# Patient Record
Sex: FEMALE | Race: WHITE | NOT HISPANIC OR LATINO | Employment: FULL TIME | URBAN - METROPOLITAN AREA
[De-identification: names, ages, dates, MRNs, and addresses within clinical notes are randomized per-mention and may not be internally consistent; named-entity substitution may affect disease eponyms.]

---

## 2021-05-28 ENCOUNTER — RECORDS - HEALTHEAST (OUTPATIENT)
Dept: ADMINISTRATIVE | Facility: CLINIC | Age: 41
End: 2021-05-28

## 2021-09-27 DIAGNOSIS — Z11.59 ENCOUNTER FOR SCREENING FOR OTHER VIRAL DISEASES: ICD-10-CM

## 2021-10-10 ENCOUNTER — LAB (OUTPATIENT)
Dept: FAMILY MEDICINE | Facility: CLINIC | Age: 41
End: 2021-10-10
Payer: COMMERCIAL

## 2021-10-10 DIAGNOSIS — Z11.59 ENCOUNTER FOR SCREENING FOR OTHER VIRAL DISEASES: ICD-10-CM

## 2021-10-10 PROCEDURE — U0005 INFEC AGEN DETEC AMPLI PROBE: HCPCS

## 2021-10-10 PROCEDURE — U0003 INFECTIOUS AGENT DETECTION BY NUCLEIC ACID (DNA OR RNA); SEVERE ACUTE RESPIRATORY SYNDROME CORONAVIRUS 2 (SARS-COV-2) (CORONAVIRUS DISEASE [COVID-19]), AMPLIFIED PROBE TECHNIQUE, MAKING USE OF HIGH THROUGHPUT TECHNOLOGIES AS DESCRIBED BY CMS-2020-01-R: HCPCS

## 2021-10-11 LAB — SARS-COV-2 RNA RESP QL NAA+PROBE: NEGATIVE

## 2021-10-12 ASSESSMENT — MIFFLIN-ST. JEOR: SCORE: 1190.25

## 2021-10-13 ENCOUNTER — ANESTHESIA EVENT (OUTPATIENT)
Dept: SURGERY | Facility: AMBULATORY SURGERY CENTER | Age: 41
End: 2021-10-13

## 2021-10-14 ENCOUNTER — HOSPITAL ENCOUNTER (OUTPATIENT)
Facility: AMBULATORY SURGERY CENTER | Age: 41
End: 2021-10-14
Attending: OBSTETRICS & GYNECOLOGY
Payer: COMMERCIAL

## 2021-10-14 ENCOUNTER — ANESTHESIA (OUTPATIENT)
Dept: SURGERY | Facility: AMBULATORY SURGERY CENTER | Age: 41
End: 2021-10-14

## 2021-10-14 VITALS
HEIGHT: 62 IN | TEMPERATURE: 97.3 F | RESPIRATION RATE: 16 BRPM | SYSTOLIC BLOOD PRESSURE: 87 MMHG | BODY MASS INDEX: 23 KG/M2 | DIASTOLIC BLOOD PRESSURE: 48 MMHG | HEART RATE: 58 BPM | OXYGEN SATURATION: 98 % | WEIGHT: 125 LBS

## 2021-10-14 DIAGNOSIS — N70.91 SALPINGITIS: ICD-10-CM

## 2021-10-14 LAB
HCG UR QL: NEGATIVE
HEMOGLOBIN: 13.1 G/DL
INTERNAL QC OK POCT: NORMAL

## 2021-10-14 RX ORDER — SODIUM CHLORIDE, SODIUM LACTATE, POTASSIUM CHLORIDE, CALCIUM CHLORIDE 600; 310; 30; 20 MG/100ML; MG/100ML; MG/100ML; MG/100ML
INJECTION, SOLUTION INTRAVENOUS CONTINUOUS
Status: DISCONTINUED | OUTPATIENT
Start: 2021-10-14 | End: 2021-10-15 | Stop reason: HOSPADM

## 2021-10-14 RX ORDER — ONDANSETRON 4 MG/1
4 TABLET, ORALLY DISINTEGRATING ORAL EVERY 30 MIN PRN
Status: DISCONTINUED | OUTPATIENT
Start: 2021-10-14 | End: 2021-10-15 | Stop reason: HOSPADM

## 2021-10-14 RX ORDER — PROPOFOL 10 MG/ML
INJECTION, EMULSION INTRAVENOUS PRN
Status: DISCONTINUED | OUTPATIENT
Start: 2021-10-14 | End: 2021-10-14

## 2021-10-14 RX ORDER — HYDROMORPHONE HCL IN WATER/PF 6 MG/30 ML
0.2 PATIENT CONTROLLED ANALGESIA SYRINGE INTRAVENOUS EVERY 5 MIN PRN
Status: DISCONTINUED | OUTPATIENT
Start: 2021-10-14 | End: 2021-10-15 | Stop reason: HOSPADM

## 2021-10-14 RX ORDER — IBUPROFEN 800 MG/1
800 TABLET, FILM COATED ORAL ONCE
Status: DISCONTINUED | OUTPATIENT
Start: 2021-10-14 | End: 2021-10-15 | Stop reason: HOSPADM

## 2021-10-14 RX ORDER — DEXAMETHASONE SODIUM PHOSPHATE 4 MG/ML
INJECTION, SOLUTION INTRA-ARTICULAR; INTRALESIONAL; INTRAMUSCULAR; INTRAVENOUS; SOFT TISSUE PRN
Status: DISCONTINUED | OUTPATIENT
Start: 2021-10-14 | End: 2021-10-14

## 2021-10-14 RX ORDER — ONDANSETRON 2 MG/ML
4 INJECTION INTRAMUSCULAR; INTRAVENOUS EVERY 30 MIN PRN
Status: DISCONTINUED | OUTPATIENT
Start: 2021-10-14 | End: 2021-10-15 | Stop reason: HOSPADM

## 2021-10-14 RX ORDER — ACETAMINOPHEN 325 MG/1
975 TABLET ORAL ONCE
Status: DISCONTINUED | OUTPATIENT
Start: 2021-10-14 | End: 2021-10-15 | Stop reason: HOSPADM

## 2021-10-14 RX ORDER — PHENYLEPHRINE HYDROCHLORIDE 10 MG/ML
INJECTION INTRAVENOUS PRN
Status: DISCONTINUED | OUTPATIENT
Start: 2021-10-14 | End: 2021-10-14

## 2021-10-14 RX ORDER — FENTANYL CITRATE 50 UG/ML
INJECTION, SOLUTION INTRAMUSCULAR; INTRAVENOUS PRN
Status: DISCONTINUED | OUTPATIENT
Start: 2021-10-14 | End: 2021-10-14

## 2021-10-14 RX ORDER — LIDOCAINE 40 MG/G
CREAM TOPICAL
Status: DISCONTINUED | OUTPATIENT
Start: 2021-10-14 | End: 2021-10-15 | Stop reason: HOSPADM

## 2021-10-14 RX ORDER — IBUPROFEN 800 MG/1
800 TABLET, FILM COATED ORAL EVERY 6 HOURS PRN
Qty: 30 TABLET | Refills: 0 | Status: ON HOLD | OUTPATIENT
Start: 2021-10-14 | End: 2023-11-28

## 2021-10-14 RX ORDER — OXYCODONE HYDROCHLORIDE 5 MG/1
5 TABLET ORAL EVERY 4 HOURS PRN
Status: DISCONTINUED | OUTPATIENT
Start: 2021-10-14 | End: 2021-10-15 | Stop reason: HOSPADM

## 2021-10-14 RX ORDER — FENTANYL CITRATE 50 UG/ML
25 INJECTION, SOLUTION INTRAMUSCULAR; INTRAVENOUS EVERY 5 MIN PRN
Status: DISCONTINUED | OUTPATIENT
Start: 2021-10-14 | End: 2021-10-15 | Stop reason: HOSPADM

## 2021-10-14 RX ORDER — BUPIVACAINE HYDROCHLORIDE 2.5 MG/ML
INJECTION, SOLUTION INFILTRATION; PERINEURAL PRN
Status: DISCONTINUED | OUTPATIENT
Start: 2021-10-14 | End: 2021-10-14 | Stop reason: HOSPADM

## 2021-10-14 RX ORDER — KETOROLAC TROMETHAMINE 15 MG/ML
INJECTION, SOLUTION INTRAMUSCULAR; INTRAVENOUS PRN
Status: DISCONTINUED | OUTPATIENT
Start: 2021-10-14 | End: 2021-10-14

## 2021-10-14 RX ORDER — FENTANYL CITRATE 50 UG/ML
25 INJECTION, SOLUTION INTRAMUSCULAR; INTRAVENOUS
Status: DISCONTINUED | OUTPATIENT
Start: 2021-10-14 | End: 2021-10-15 | Stop reason: HOSPADM

## 2021-10-14 RX ORDER — MEPERIDINE HYDROCHLORIDE 25 MG/ML
12.5 INJECTION INTRAMUSCULAR; INTRAVENOUS; SUBCUTANEOUS
Status: DISCONTINUED | OUTPATIENT
Start: 2021-10-14 | End: 2021-10-15 | Stop reason: HOSPADM

## 2021-10-14 RX ORDER — ACETAMINOPHEN 325 MG/1
975 TABLET ORAL ONCE
Status: COMPLETED | OUTPATIENT
Start: 2021-10-14 | End: 2021-10-14

## 2021-10-14 RX ORDER — PROPOFOL 10 MG/ML
INJECTION, EMULSION INTRAVENOUS CONTINUOUS PRN
Status: DISCONTINUED | OUTPATIENT
Start: 2021-10-14 | End: 2021-10-14

## 2021-10-14 RX ORDER — GLYCOPYRROLATE 0.2 MG/ML
INJECTION, SOLUTION INTRAMUSCULAR; INTRAVENOUS PRN
Status: DISCONTINUED | OUTPATIENT
Start: 2021-10-14 | End: 2021-10-14

## 2021-10-14 RX ORDER — NEOSTIGMINE METHYLSULFATE 1 MG/ML
VIAL (ML) INJECTION PRN
Status: DISCONTINUED | OUTPATIENT
Start: 2021-10-14 | End: 2021-10-14

## 2021-10-14 RX ORDER — MAGNESIUM SULFATE HEPTAHYDRATE 40 MG/ML
4 INJECTION, SOLUTION INTRAVENOUS ONCE
Status: COMPLETED | OUTPATIENT
Start: 2021-10-14 | End: 2021-10-14

## 2021-10-14 RX ORDER — ONDANSETRON 2 MG/ML
INJECTION INTRAMUSCULAR; INTRAVENOUS PRN
Status: DISCONTINUED | OUTPATIENT
Start: 2021-10-14 | End: 2021-10-14

## 2021-10-14 RX ADMIN — PHENYLEPHRINE HYDROCHLORIDE 100 MCG: 10 INJECTION INTRAVENOUS at 09:01

## 2021-10-14 RX ADMIN — GLYCOPYRROLATE 0.3 MG: 0.2 INJECTION, SOLUTION INTRAMUSCULAR; INTRAVENOUS at 09:29

## 2021-10-14 RX ADMIN — PROPOFOL 180 MCG/KG/MIN: 10 INJECTION, EMULSION INTRAVENOUS at 08:52

## 2021-10-14 RX ADMIN — MAGNESIUM SULFATE HEPTAHYDRATE 4 G: 40 INJECTION, SOLUTION INTRAVENOUS at 08:17

## 2021-10-14 RX ADMIN — ACETAMINOPHEN 975 MG: 325 TABLET ORAL at 08:08

## 2021-10-14 RX ADMIN — Medication 2.5 MG: at 09:29

## 2021-10-14 RX ADMIN — OXYCODONE HYDROCHLORIDE 5 MG: 5 TABLET ORAL at 10:36

## 2021-10-14 RX ADMIN — FENTANYL CITRATE 25 MCG: 50 INJECTION, SOLUTION INTRAMUSCULAR; INTRAVENOUS at 10:02

## 2021-10-14 RX ADMIN — FENTANYL CITRATE 100 MCG: 50 INJECTION, SOLUTION INTRAMUSCULAR; INTRAVENOUS at 08:52

## 2021-10-14 RX ADMIN — SODIUM CHLORIDE, SODIUM LACTATE, POTASSIUM CHLORIDE, CALCIUM CHLORIDE: 600; 310; 30; 20 INJECTION, SOLUTION INTRAVENOUS at 08:45

## 2021-10-14 RX ADMIN — PROPOFOL 170 MG: 10 INJECTION, EMULSION INTRAVENOUS at 08:52

## 2021-10-14 RX ADMIN — DEXAMETHASONE SODIUM PHOSPHATE 10 MG: 4 INJECTION, SOLUTION INTRA-ARTICULAR; INTRALESIONAL; INTRAMUSCULAR; INTRAVENOUS; SOFT TISSUE at 08:52

## 2021-10-14 RX ADMIN — ONDANSETRON 4 MG: 2 INJECTION INTRAMUSCULAR; INTRAVENOUS at 09:22

## 2021-10-14 RX ADMIN — PROPOFOL 30 MG: 10 INJECTION, EMULSION INTRAVENOUS at 08:55

## 2021-10-14 RX ADMIN — FENTANYL CITRATE 25 MCG: 50 INJECTION, SOLUTION INTRAMUSCULAR; INTRAVENOUS at 09:54

## 2021-10-14 RX ADMIN — Medication 30 MG: at 08:53

## 2021-10-14 RX ADMIN — PROPOFOL 50 MG: 10 INJECTION, EMULSION INTRAVENOUS at 09:21

## 2021-10-14 RX ADMIN — KETOROLAC TROMETHAMINE 15 MG: 15 INJECTION, SOLUTION INTRAMUSCULAR; INTRAVENOUS at 09:29

## 2021-10-14 RX ADMIN — PHENYLEPHRINE HYDROCHLORIDE 100 MCG: 10 INJECTION INTRAVENOUS at 08:58

## 2021-10-14 RX ADMIN — PHENYLEPHRINE HYDROCHLORIDE 100 MCG: 10 INJECTION INTRAVENOUS at 09:03

## 2021-10-14 ASSESSMENT — MIFFLIN-ST. JEOR: SCORE: 1190.25

## 2021-10-14 NOTE — PROGRESS NOTES
Report received from Yady EDWARDS RN. Pt care assumed at this time.    Andra Miguel RN on 10/14/2021 at 10:16 AM

## 2021-10-14 NOTE — ANESTHESIA CARE TRANSFER NOTE
Patient: Rachel Machado    Procedure: Procedure(s):  LAPAROSCOPIC LEFT SALPINGECTOMY, HYSTEROSCOPY DILATION AND CURETTAGE       Diagnosis: Salpingitis [N70.91]  Diagnosis Additional Information: No value filed.    Anesthesia Type:   General     Note:    Oropharynx: oropharynx clear of all foreign objects and spontaneously breathing  Level of Consciousness: drowsy  Oxygen Supplementation: face mask  Level of Supplemental Oxygen (L/min / FiO2): 10  Independent Airway: airway patency satisfactory and stable  Dentition: dentition unchanged  Vital Signs Stable: post-procedure vital signs reviewed and stable  Report to RN Given: handoff report given  Patient transferred to: PACU    Handoff Report: Identifed the Patient, Identified the Reponsible Provider, Reviewed the pertinent medical history, Discussed the surgical course, Reviewed Intra-OP anesthesia mangement and issues during anesthesia, Set expectations for post-procedure period and Allowed opportunity for questions and acknowledgement of understanding      Vitals:  Vitals Value Taken Time   BP 84/49 10/14/21 0939   Temp 97.2  F (36.2  C) 10/14/21 0939   Pulse 68 10/14/21 0940   Resp 16 10/14/21 0939   SpO2 100 % 10/14/21 0940   Vitals shown include unvalidated device data.    Electronically Signed By: LILLIAN QUEZADA CRNA  October 14, 2021  9:45 AM

## 2021-10-14 NOTE — DISCHARGE INSTRUCTIONS
Acetaminophen (Tylenol): Next Dose: 2:00 pm. Take 650-1000 mg every 6 hours for mild to moderate pain.    Ibuprofen (Motrin, Advil): Next Dose: 3:30 pm. Take 600 mg every 6 hours for mild to moderate pain.    You should not exceed 1000 mg of acetaminophen (Tylenol) per dose or 4000 mg per day. If you are taking a narcotic that contains acetaminophen (Tylenol) keep track of your Tylenol dosage.    If you are prescribed narcotic pain medications (Oxycodone, Norco, Percocet, Tylenol #3, Dilaudid, etc) then you should try to wean off of them as tolerated. These are AS NEEDED medications, so if you are not having significant pain you should try to take fewer pills at a time or spread the doses out over a longer period of time than is written on the prescription. As an example if you are prescribed 1-2 pills of pain medication every 4 hours AS NEEDED for pain, then you should begin taking only 1 pill every 4 hours as your pain tolerates. Then when 1 pill is giving good pain relief you should try to spread the doses out longer than 4 hours apart as you can tolerate it.    Discharge Instructions: After Your Surgery    You ve just had surgery. During surgery, you were given medicine called anesthesia to keep you relaxed and free of pain. After surgery, you may have some pain or nausea. This is common. Here are some tips for feeling better and getting well after surgery.    Going home    Your healthcare provider will show you how to take care of yourself when you go home. He or she will also answer your questions. Have an adult family member or friend drive you home. For the first 24 hours after your surgery:    Don't drive or use heavy equipment.    Don't make important decisions or sign legal papers.    Don't drink alcohol.    Have someone stay with you, if needed. He or she can watch for problems and help keep you safe.  Be sure to go to all follow-up visits with your healthcare provider. And rest after your surgery for as  long as your healthcare provider tells you to.    Coping with pain    If you have pain after surgery, pain medicine will help you feel better. Take it as told, before pain becomes severe. Also, ask your healthcare provider or pharmacist about other ways to control pain. This might be with heat, ice, or relaxation. And follow any other instructions your surgeon or nurse gives you.    Tips for taking pain medicine    To get the best relief possible, remember these points:    Pain medicines can upset your stomach. Taking them with a little food may help.    Most pain relievers taken by mouth need at least 20 to 30 minutes to start to work.    Don't wait till your pain becomes severe before you take your medicine. Try to time your medicine so that you can take it before starting an activity. This might be before you get dressed, go for a walk, or sit down for dinner.    Constipation is a common side effect of pain medicines. It's ok to take medicines such as laxatives or stool softeners to help ease constipation. Drinking lots of fluids and eating foods such as fruits and vegetables that are high in fiber can also help.    Drinking alcohol and taking pain medicine can cause dizziness and slow your breathing. It can even be deadly. Don't drink alcohol while taking pain medicine.    Pain medicine can make you react more slowly to things. Don't drive or run machinery while taking pain medicine.  Your healthcare provider may tell you to take acetaminophen to help ease your pain. Ask him or her how much you are supposed to take each day. Acetaminophen or other pain relievers may interact with your prescription medicines or other over-the-counter (OTC) medicines. Some prescription medicines have acetaminophen and other ingredients. Using both prescription and OTC acetaminophen for pain can cause you to overdose. Read the labels on your OTC medicines with care. This will help you to clearly know the list of ingredients, how  much to take, and any warnings. It may also help you not take too much acetaminophen. If you have questions or don't understand the information, ask your pharmacist or healthcare provider to explain it to you before you take the OTC medicine.    Managing nausea    Some people have an upset stomach after surgery. This is often because of anesthesia, pain, or pain medicine, or the stress of surgery. These tips will help you handle nausea and eat healthy foods as you get better. If you were on a special food plan before surgery, ask your healthcare provider if you should follow it while you get better. These tips may help:      Don't push yourself to eat. Your body will tell you when to eat and how much.    Start off with clear liquids and soup. They are easier to digest.    Next try semi-solid foods, such as mashed potatoes, applesauce, and gelatin, as you feel ready.    Slowly move to solid foods. Don t eat fatty, rich, or spicy foods at first.    Don't force yourself to have 3 large meals a day. Instead eat smaller amounts more often.    Take pain medicines with a small amount of solid food, such as crackers or toast, to prevent nausea.    When to call your healthcare provider    Call your healthcare provider if:    You still have intolerable pain an hour after taking medicine. The medicine may not be strong enough.    You feel too sleepy, dizzy, or groggy. The medicine may be too strong.    You have side effects such as nausea or vomiting, or skin changes such as rash, itching, or hives. Your healthcare provider may suggest other medicines to control side effects.  Rash, itching, or hives may mean you have an allergic reaction. Report this right away. If you have trouble breathing or facial swelling, call 911 right away.

## 2021-10-14 NOTE — ANESTHESIA POSTPROCEDURE EVALUATION
Patient: Rachel Machado    Procedure: Procedure(s):  LAPAROSCOPIC LEFT SALPINGECTOMY, HYSTEROSCOPY DILATION AND CURETTAGE       Diagnosis:Salpingitis [N70.91]  Diagnosis Additional Information: No value filed.    Anesthesia Type:  General    Note:  Disposition: Outpatient   Postop Pain Control: Uneventful            Sign Out: Well controlled pain   PONV: No   Neuro/Psych: Uneventful            Sign Out: Acceptable/Baseline neuro status   Airway/Respiratory: Uneventful            Sign Out: Acceptable/Baseline resp. status   CV/Hemodynamics: Uneventful            Sign Out: Acceptable CV status; No obvious hypovolemia; No obvious fluid overload   Other NRE: NONE   DID A NON-ROUTINE EVENT OCCUR? No           Last vitals:  Vitals Value Taken Time   BP 88/52 10/14/21 1020   Temp 97.3  F (36.3  C) 10/14/21 1020   Pulse 56 10/14/21 1020   Resp 16 10/14/21 1000   SpO2 98 % 10/14/21 1020       Electronically Signed By: Azam Pretty MD  October 14, 2021  11:09 AM

## 2021-10-14 NOTE — H&P
History and Physical Update    I have examined the patient and reviewed the history and physical that is present on this chart. The changes in the patient's history and physical condition are as follows:      Plan for L sided salpingectomy and hysteroscopy with emb.      No recent medical changes.      Gloria Tim MD

## 2021-10-14 NOTE — ANESTHESIA PROCEDURE NOTES
Airway       Patient location during procedure: OR       Procedure Start/Stop Times: 10/14/2021 8:55 AM  Staff -        Anesthesiologist:  Azam Pretty MD       CRNA: Fabienne Vang APRN CRNA       Performed By: CRNA  Consent for Airway        Urgency: elective  Indications and Patient Condition       Indications for airway management: salinas-procedural       Induction type:intravenous       Mask difficulty assessment: 1 - vent by mask    Final Airway Details       Final airway type: endotracheal airway       Successful airway: ETT - single and Oral  Endotracheal Airway Details        ETT size (mm): 7.0       Cuffed: yes       Successful intubation technique: direct laryngoscopy       DL Blade Type: MAC 3       Grade View of Cords: 1       Adjucts: stylet and tooth guard       Position: Right       Measured from: gums/teeth       Secured at (cm): 20       Bite block used: None    Post intubation assessment        Placement verified by: capnometry, equal breath sounds and chest rise        Number of attempts at approach: 1       Number of other approaches attempted: 0       Secured with: silk tape       Ease of procedure: easy       Dentition: Intact

## 2021-10-14 NOTE — OP NOTE
PROCEDURE NOTE - LAPAROSCOPY, HYSTEROSCOPY AND DILATION AND CURETTAGE     NAME: Rachel Machado   :  1980   MRN: [id number]      DATE OF SERVICE: 10/14/2021     PREOPERATIVE DIAGNOSIS:   Hydrosalpinx.  Recurrent pregnancy loss.      POSTOPERATIVE DIAGNOSIS:  The same.      PROCEDURES: laparoscopy, L  salpingectomy and dilation and curettage    SURGEON: Gloria Tim DO     ASSISTANT:  OR staff    ANESTHESIA: general     ESTIMATED BLOOD LOSS: * No blood loss amount entered *    HISTORY OF PRESENT ILLNESS:   Rachel Machado is a 41 year old female with history of recurrent preg loss. She had a transvaginal ultrasound which demonstrated Left sided hydrosalpinx.     CONSENT:  She was met preoperatively, where we discussed the procedure and the risks associated with the procedure. She understood these to be, but not limited to injury to adjacent organs including bladder, bowel, ureter, infection and bleeding. Patient signed consent.    PROCEDURE  Patient was  brought back to the operating room in stable condition. Patient underwent induction of a general anesthetic, she was carefully prepped and draped in sterile fashion in the dorsal lithotomy position. Timeout was performed. Bladder was drained with a Stewart catheter, uterine manipulator placed into the uterus.     Attention was turned to the abdomen. An incision above the umbilicus. A Veress needle was introduced with a 2 pop technique, saline drop test confirmed adequate placement. Opening pressure was 3-4. Insufflation was done until 15mm of pressure were established. A 5 nonbladed trocar was placed above the umbilicus. One more port sight was placed in the left lower quadrants under direct visualization. Trandelenburg assistance was then used.     The procedure began with identifying the anatomy. The uterus was visualized was found to be normal appearing.  L tube with a minimal hydrosalpinx and R tube wnl.  Both ovaries appeared wnl.   Normal appendix.  Normal posterior cul-de-sac.      The Ligasure was used to remove the L fallopian tube off of the mesosalpinx and then remove from the LLQ port.      The trocars were then removed and the gas was removed from the abdomen. Skin was closed with monocryl suture. Steri-Strips applied. Instruments were removed from vagina. Procedure was then turned to the lower field. A bimanual exam was done, demonstrating AV. No other abnormalities were noted. No active bleeding was noted.     A sterile weighted speculum was placed. The anterior lip of the cervix were grasped with single tooth tenaculum. Uterus was then sounded to 8cm. The cervix was gently dilated using Tootie dilators and the hysteroscope was introduced. Cavity of the uterus was noted to be normal. At this point endometrial curettings were obtained. In each case all quadrants were sampled, and the tissue was submitted for pathologic exam. At this point good hemostasis was noted with this portion of the procedure. Instruments were removed from vagina. No active bleeding was noted. Sponge and needle counts were correct X 2. She was taken to recovery in stable condition.      Gloria Tim DO, FACOG  10/14/2021 9:36 AM       CC: No primary care provider on file., Gloria Tim MD

## 2021-10-14 NOTE — ANESTHESIA PREPROCEDURE EVALUATION
Anesthesia Pre-Procedure Evaluation    Patient: Rachel Machado   MRN: [id number] : 1980        Preoperative Diagnosis: Salpingitis [N70.91]    Procedure : Procedure(s):  LAPAROSCOPIC LEFT SALPINGECTOMY          History reviewed. No pertinent past medical history.   Past Surgical History:   Procedure Laterality Date     WISDOM TOOTH EXTRACTION        No Known Allergies   Social History     Tobacco Use     Smoking status: Never Smoker     Smokeless tobacco: Never Used   Substance Use Topics     Alcohol use: Yes     Comment: socially      Wt Readings from Last 1 Encounters:   10/14/21 56.7 kg (125 lb)        Anesthesia Evaluation   Pt has had prior anesthetic. Type: MAC.    No history of anesthetic complications       ROS/MED HX  ENT/Pulmonary:  - neg pulmonary ROS     Neurologic:  - neg neurologic ROS     Cardiovascular:  - neg cardiovascular ROS     METS/Exercise Tolerance:     Hematologic:  - neg hematologic  ROS     Musculoskeletal:  - neg musculoskeletal ROS     GI/Hepatic:  - neg GI/hepatic ROS     Renal/Genitourinary:  - neg Renal ROS     Endo:  - neg endo ROS     Psychiatric/Substance Use:  - neg psychiatric ROS     Infectious Disease:  - neg infectious disease ROS     Malignancy:  - neg malignancy ROS     Other:  - neg other ROS          Physical Exam    Airway  airway exam normal      Mallampati: I   TM distance: > 3 FB   Neck ROM: full   Mouth opening: > 3 cm    Respiratory Devices and Support         Dental  no notable dental history         Cardiovascular   cardiovascular exam normal       Rhythm and rate: regular and normal     Pulmonary   pulmonary exam normal        breath sounds clear to auscultation           OUTSIDE LABS:  CBC: No results found for: WBC, HGB, HCT, PLT  BMP: No results found for: NA, POTASSIUM, CHLORIDE, CO2, BUN, CR, GLC  COAGS: No results found for: PTT, INR, FIBR  POC:   Lab Results   Component Value Date    HCG Negative 10/14/2021     HEPATIC: No results found for:  ALBUMIN, PROTTOTAL, ALT, AST, GGT, ALKPHOS, BILITOTAL, BILIDIRECT, NADIYA  OTHER: No results found for: PH, LACT, A1C, MIGUEL, PHOS, MAG, LIPASE, AMYLASE, TSH, T4, T3, CRP, SED    Anesthesia Plan    ASA Status:  1   NPO Status:  NPO Appropriate    Anesthesia Type: General.     - Airway: ETT   Induction: Propofol, Intravenous.   Maintenance: TIVA.        Consents    Anesthesia Plan(s) and associated risks, benefits, and realistic alternatives discussed. Questions answered and patient/representative(s) expressed understanding.     - Discussed with:  Patient         Postoperative Care    Pain management: Multi-modal analgesia.   PONV prophylaxis: Ondansetron (or other 5HT-3), Dexamethasone or Solumedrol     Comments:    Reviewed anesthetic options and risks, including risk of dental trauma. Patient agrees to proceed.     Recent Results (from the past 120 hour(s))  -Asymptomatic COVID-19 Virus (Coronavirus) by PCR Nose  Collection Time: 10/10/21  1:57 PM  Specimen: Nose; Swab       Result                                            Value                         Ref Range                       SARS CoV2 PCR                                     Negative                      Negative                   -hCG qual urine POCT  Collection Time: 10/14/21  7:45 AM       Result                                            Value                         Ref Range                       HCG Qual Urine                                    Negative                      Negative                        Internal QC Check POCT                            Valid                         Valid                                  Azam Pretty MD

## 2022-10-20 ENCOUNTER — LAB REQUISITION (OUTPATIENT)
Dept: LAB | Facility: CLINIC | Age: 42
End: 2022-10-20

## 2022-10-20 DIAGNOSIS — Z11.3 ENCOUNTER FOR SCREENING FOR INFECTIONS WITH A PREDOMINANTLY SEXUAL MODE OF TRANSMISSION: ICD-10-CM

## 2022-10-20 LAB
HBV SURFACE AG SERPL QL IA: NONREACTIVE
HCV AB SERPL QL IA: NONREACTIVE
HIV 1+2 AB+HIV1 P24 AG SERPL QL IA: NONREACTIVE
T PALLIDUM AB SER QL: NONREACTIVE

## 2022-10-20 PROCEDURE — 87491 CHLMYD TRACH DNA AMP PROBE: CPT | Performed by: NURSE PRACTITIONER

## 2022-10-20 PROCEDURE — 86780 TREPONEMA PALLIDUM: CPT | Performed by: NURSE PRACTITIONER

## 2022-10-20 PROCEDURE — 87340 HEPATITIS B SURFACE AG IA: CPT | Performed by: NURSE PRACTITIONER

## 2022-10-20 PROCEDURE — 87389 HIV-1 AG W/HIV-1&-2 AB AG IA: CPT | Performed by: NURSE PRACTITIONER

## 2022-10-20 PROCEDURE — 87591 N.GONORRHOEAE DNA AMP PROB: CPT | Performed by: NURSE PRACTITIONER

## 2022-10-20 PROCEDURE — 86803 HEPATITIS C AB TEST: CPT | Performed by: NURSE PRACTITIONER

## 2022-10-21 LAB
C TRACH DNA SPEC QL PROBE+SIG AMP: NEGATIVE
N GONORRHOEA DNA SPEC QL NAA+PROBE: NEGATIVE

## 2022-12-06 ENCOUNTER — LAB REQUISITION (OUTPATIENT)
Dept: LAB | Facility: CLINIC | Age: 42
End: 2022-12-06
Payer: COMMERCIAL

## 2022-12-06 DIAGNOSIS — Z31.41 ENCOUNTER FOR FERTILITY TESTING: ICD-10-CM

## 2022-12-06 LAB
DEPRECATED CALCIDIOL+CALCIFEROL SERPL-MC: 86 UG/L (ref 20–75)
DHEA-S SERPL-MCNC: 43 UG/DL (ref 35–430)
FSH SERPL IRP2-ACNC: 9.9 MIU/ML
HOLD SPECIMEN: NORMAL
MIS SERPL-MCNC: 0.56 NG/ML (ref 0.03–5.5)
TSH SERPL DL<=0.005 MIU/L-ACNC: 1.61 UIU/ML (ref 0.3–4.2)

## 2022-12-06 PROCEDURE — 82306 VITAMIN D 25 HYDROXY: CPT | Performed by: OBSTETRICS & GYNECOLOGY

## 2022-12-06 PROCEDURE — 83001 ASSAY OF GONADOTROPIN (FSH): CPT | Performed by: OBSTETRICS & GYNECOLOGY

## 2022-12-06 PROCEDURE — 84443 ASSAY THYROID STIM HORMONE: CPT | Performed by: OBSTETRICS & GYNECOLOGY

## 2022-12-06 PROCEDURE — 82627 DEHYDROEPIANDROSTERONE: CPT | Performed by: OBSTETRICS & GYNECOLOGY

## 2022-12-06 PROCEDURE — 83520 IMMUNOASSAY QUANT NOS NONAB: CPT | Mod: ORL | Performed by: OBSTETRICS & GYNECOLOGY

## 2023-03-09 ENCOUNTER — LAB REQUISITION (OUTPATIENT)
Dept: LAB | Facility: CLINIC | Age: 43
End: 2023-03-09

## 2023-03-09 DIAGNOSIS — Z31.83 ENCOUNTER FOR ASSISTED REPRODUCTIVE FERTILITY PROCEDURE CYCLE: ICD-10-CM

## 2023-03-09 LAB
ESTRADIOL SERPL-MCNC: 141 PG/ML
FSH SERPL IRP2-ACNC: 0.7 MIU/ML
HCG INTACT+B SERPL-ACNC: <1 MIU/ML
HOLD SPECIMEN: NORMAL
LH SERPL-ACNC: 0.5 MIU/ML
PROGEST SERPL-MCNC: 0.5 NG/ML
TSH SERPL DL<=0.005 MIU/L-ACNC: 1.69 UIU/ML (ref 0.3–4.2)

## 2023-03-09 PROCEDURE — 84443 ASSAY THYROID STIM HORMONE: CPT | Performed by: OBSTETRICS & GYNECOLOGY

## 2023-03-09 PROCEDURE — 84144 ASSAY OF PROGESTERONE: CPT | Performed by: OBSTETRICS & GYNECOLOGY

## 2023-03-09 PROCEDURE — 83002 ASSAY OF GONADOTROPIN (LH): CPT | Performed by: OBSTETRICS & GYNECOLOGY

## 2023-03-09 PROCEDURE — 82670 ASSAY OF TOTAL ESTRADIOL: CPT | Performed by: OBSTETRICS & GYNECOLOGY

## 2023-03-09 PROCEDURE — 84702 CHORIONIC GONADOTROPIN TEST: CPT | Performed by: OBSTETRICS & GYNECOLOGY

## 2023-03-09 PROCEDURE — 83001 ASSAY OF GONADOTROPIN (FSH): CPT | Performed by: OBSTETRICS & GYNECOLOGY

## 2023-03-15 ENCOUNTER — LAB REQUISITION (OUTPATIENT)
Dept: LAB | Facility: CLINIC | Age: 43
End: 2023-03-15

## 2023-03-15 DIAGNOSIS — Z31.83 ENCOUNTER FOR ASSISTED REPRODUCTIVE FERTILITY PROCEDURE CYCLE: ICD-10-CM

## 2023-03-15 LAB
ESTRADIOL SERPL-MCNC: 168 PG/ML
LH SERPL-ACNC: 3.4 MIU/ML
PROGEST SERPL-MCNC: 0.2 NG/ML

## 2023-03-15 PROCEDURE — 84144 ASSAY OF PROGESTERONE: CPT | Performed by: OBSTETRICS & GYNECOLOGY

## 2023-03-15 PROCEDURE — 82670 ASSAY OF TOTAL ESTRADIOL: CPT | Performed by: OBSTETRICS & GYNECOLOGY

## 2023-03-15 PROCEDURE — 83002 ASSAY OF GONADOTROPIN (LH): CPT | Performed by: OBSTETRICS & GYNECOLOGY

## 2023-03-17 ENCOUNTER — LAB REQUISITION (OUTPATIENT)
Dept: LAB | Facility: CLINIC | Age: 43
End: 2023-03-17

## 2023-03-17 DIAGNOSIS — Z31.83 ENCOUNTER FOR ASSISTED REPRODUCTIVE FERTILITY PROCEDURE CYCLE: ICD-10-CM

## 2023-03-17 LAB
ESTRADIOL SERPL-MCNC: 459 PG/ML
LH SERPL-ACNC: 2 MIU/ML
PROGEST SERPL-MCNC: 0.1 NG/ML

## 2023-03-17 PROCEDURE — 83002 ASSAY OF GONADOTROPIN (LH): CPT | Performed by: OBSTETRICS & GYNECOLOGY

## 2023-03-17 PROCEDURE — 82670 ASSAY OF TOTAL ESTRADIOL: CPT | Performed by: OBSTETRICS & GYNECOLOGY

## 2023-03-17 PROCEDURE — 84144 ASSAY OF PROGESTERONE: CPT | Performed by: OBSTETRICS & GYNECOLOGY

## 2023-03-20 ENCOUNTER — LAB REQUISITION (OUTPATIENT)
Dept: LAB | Facility: CLINIC | Age: 43
End: 2023-03-20

## 2023-03-20 DIAGNOSIS — Z31.83 ENCOUNTER FOR ASSISTED REPRODUCTIVE FERTILITY PROCEDURE CYCLE: ICD-10-CM

## 2023-03-20 LAB
ESTRADIOL SERPL-MCNC: 1138 PG/ML
LH SERPL-ACNC: 0.8 MIU/ML
PROGEST SERPL-MCNC: 0.4 NG/ML

## 2023-03-20 PROCEDURE — 84144 ASSAY OF PROGESTERONE: CPT | Performed by: OBSTETRICS & GYNECOLOGY

## 2023-03-20 PROCEDURE — 82670 ASSAY OF TOTAL ESTRADIOL: CPT | Performed by: OBSTETRICS & GYNECOLOGY

## 2023-03-20 PROCEDURE — 83002 ASSAY OF GONADOTROPIN (LH): CPT | Performed by: OBSTETRICS & GYNECOLOGY

## 2023-03-22 ENCOUNTER — LAB REQUISITION (OUTPATIENT)
Dept: LAB | Facility: CLINIC | Age: 43
End: 2023-03-22

## 2023-03-22 DIAGNOSIS — Z31.83 ENCOUNTER FOR ASSISTED REPRODUCTIVE FERTILITY PROCEDURE CYCLE: ICD-10-CM

## 2023-03-22 LAB
ESTRADIOL SERPL-MCNC: 1705 PG/ML
LH SERPL-ACNC: 0.9 MIU/ML
PROGEST SERPL-MCNC: 0.7 NG/ML

## 2023-03-22 PROCEDURE — 83002 ASSAY OF GONADOTROPIN (LH): CPT | Performed by: OBSTETRICS & GYNECOLOGY

## 2023-03-22 PROCEDURE — 84144 ASSAY OF PROGESTERONE: CPT | Performed by: OBSTETRICS & GYNECOLOGY

## 2023-03-22 PROCEDURE — 82670 ASSAY OF TOTAL ESTRADIOL: CPT | Performed by: OBSTETRICS & GYNECOLOGY

## 2023-03-31 ENCOUNTER — LAB REQUISITION (OUTPATIENT)
Dept: LAB | Facility: CLINIC | Age: 43
End: 2023-03-31

## 2023-03-31 DIAGNOSIS — Z31.49 ENCOUNTER FOR OTHER PROCREATIVE INVESTIGATION AND TESTING: ICD-10-CM

## 2023-03-31 LAB
ESTRADIOL SERPL-MCNC: 920 PG/ML
HOLD SPECIMEN: NORMAL
PROGEST SERPL-MCNC: 120.9 NG/ML

## 2023-03-31 PROCEDURE — 84144 ASSAY OF PROGESTERONE: CPT | Performed by: OBSTETRICS & GYNECOLOGY

## 2023-03-31 PROCEDURE — 82670 ASSAY OF TOTAL ESTRADIOL: CPT | Performed by: OBSTETRICS & GYNECOLOGY

## 2023-04-07 ENCOUNTER — LAB REQUISITION (OUTPATIENT)
Dept: LAB | Facility: CLINIC | Age: 43
End: 2023-04-07

## 2023-04-07 DIAGNOSIS — Z32.00 ENCOUNTER FOR PREGNANCY TEST, RESULT UNKNOWN: ICD-10-CM

## 2023-04-07 LAB
HCG INTACT+B SERPL-ACNC: <1 MIU/ML
PROGEST SERPL-MCNC: 406 NG/ML

## 2023-04-07 PROCEDURE — 84144 ASSAY OF PROGESTERONE: CPT | Performed by: OBSTETRICS & GYNECOLOGY

## 2023-04-07 PROCEDURE — 84702 CHORIONIC GONADOTROPIN TEST: CPT | Performed by: OBSTETRICS & GYNECOLOGY

## 2023-05-19 ENCOUNTER — LAB REQUISITION (OUTPATIENT)
Dept: LAB | Facility: CLINIC | Age: 43
End: 2023-05-19

## 2023-05-19 DIAGNOSIS — Z32.00 ENCOUNTER FOR PREGNANCY TEST, RESULT UNKNOWN: ICD-10-CM

## 2023-05-19 LAB
HCG INTACT+B SERPL-ACNC: 1523 MIU/ML
PROGEST SERPL-MCNC: 33.3 NG/ML
TSH SERPL DL<=0.005 MIU/L-ACNC: 1.2 UIU/ML (ref 0.3–4.2)

## 2023-05-19 PROCEDURE — 84443 ASSAY THYROID STIM HORMONE: CPT | Performed by: OBSTETRICS & GYNECOLOGY

## 2023-05-19 PROCEDURE — 84144 ASSAY OF PROGESTERONE: CPT | Performed by: OBSTETRICS & GYNECOLOGY

## 2023-05-19 PROCEDURE — 84702 CHORIONIC GONADOTROPIN TEST: CPT | Performed by: OBSTETRICS & GYNECOLOGY

## 2023-05-22 ENCOUNTER — LAB REQUISITION (OUTPATIENT)
Dept: LAB | Facility: CLINIC | Age: 43
End: 2023-05-22

## 2023-05-22 DIAGNOSIS — Z32.01 ENCOUNTER FOR PREGNANCY TEST, RESULT POSITIVE: ICD-10-CM

## 2023-05-22 LAB
ESTRADIOL SERPL-MCNC: 430 PG/ML
HCG INTACT+B SERPL-ACNC: 5141 MIU/ML
PROGEST SERPL-MCNC: 25.8 NG/ML

## 2023-05-22 PROCEDURE — 84144 ASSAY OF PROGESTERONE: CPT | Performed by: OBSTETRICS & GYNECOLOGY

## 2023-05-22 PROCEDURE — 82670 ASSAY OF TOTAL ESTRADIOL: CPT | Performed by: OBSTETRICS & GYNECOLOGY

## 2023-05-22 PROCEDURE — 84702 CHORIONIC GONADOTROPIN TEST: CPT | Performed by: OBSTETRICS & GYNECOLOGY

## 2023-05-31 ENCOUNTER — LAB REQUISITION (OUTPATIENT)
Dept: LAB | Facility: CLINIC | Age: 43
End: 2023-05-31

## 2023-05-31 DIAGNOSIS — O09.00 SUPERVISION OF PREGNANCY WITH HISTORY OF INFERTILITY, UNSPECIFIED TRIMESTER: ICD-10-CM

## 2023-05-31 LAB
ESTRADIOL SERPL-MCNC: 1016 PG/ML
HCG INTACT+B SERPL-ACNC: ABNORMAL MIU/ML
PROGEST SERPL-MCNC: 50.1 NG/ML

## 2023-05-31 PROCEDURE — 82670 ASSAY OF TOTAL ESTRADIOL: CPT | Performed by: OBSTETRICS & GYNECOLOGY

## 2023-05-31 PROCEDURE — 84144 ASSAY OF PROGESTERONE: CPT | Performed by: OBSTETRICS & GYNECOLOGY

## 2023-05-31 PROCEDURE — 84702 CHORIONIC GONADOTROPIN TEST: CPT | Performed by: OBSTETRICS & GYNECOLOGY

## 2023-06-07 ENCOUNTER — LAB REQUISITION (OUTPATIENT)
Dept: LAB | Facility: CLINIC | Age: 43
End: 2023-06-07

## 2023-06-07 DIAGNOSIS — O09.00 SUPERVISION OF PREGNANCY WITH HISTORY OF INFERTILITY, UNSPECIFIED TRIMESTER: ICD-10-CM

## 2023-06-07 LAB
ESTRADIOL SERPL-MCNC: 1083 PG/ML
HCG INTACT+B SERPL-ACNC: ABNORMAL MIU/ML
PROGEST SERPL-MCNC: 44.7 NG/ML

## 2023-06-07 PROCEDURE — 84702 CHORIONIC GONADOTROPIN TEST: CPT | Performed by: OBSTETRICS & GYNECOLOGY

## 2023-06-07 PROCEDURE — 82670 ASSAY OF TOTAL ESTRADIOL: CPT | Performed by: OBSTETRICS & GYNECOLOGY

## 2023-06-07 PROCEDURE — 84144 ASSAY OF PROGESTERONE: CPT | Performed by: OBSTETRICS & GYNECOLOGY

## 2023-06-14 ENCOUNTER — LAB REQUISITION (OUTPATIENT)
Dept: LAB | Facility: CLINIC | Age: 43
End: 2023-06-14

## 2023-06-14 DIAGNOSIS — O09.00 SUPERVISION OF PREGNANCY WITH HISTORY OF INFERTILITY, UNSPECIFIED TRIMESTER: ICD-10-CM

## 2023-06-14 LAB
ESTRADIOL SERPL-MCNC: 1034 PG/ML
HCG INTACT+B SERPL-ACNC: ABNORMAL MIU/ML
PROGEST SERPL-MCNC: 42.3 NG/ML

## 2023-06-14 PROCEDURE — 84702 CHORIONIC GONADOTROPIN TEST: CPT | Performed by: OBSTETRICS & GYNECOLOGY

## 2023-06-14 PROCEDURE — 84144 ASSAY OF PROGESTERONE: CPT | Performed by: OBSTETRICS & GYNECOLOGY

## 2023-06-14 PROCEDURE — 82670 ASSAY OF TOTAL ESTRADIOL: CPT | Performed by: OBSTETRICS & GYNECOLOGY

## 2023-06-19 ENCOUNTER — LAB REQUISITION (OUTPATIENT)
Dept: LAB | Facility: CLINIC | Age: 43
End: 2023-06-19

## 2023-06-19 DIAGNOSIS — Z34.81 ENCOUNTER FOR SUPERVISION OF OTHER NORMAL PREGNANCY, FIRST TRIMESTER: ICD-10-CM

## 2023-06-19 DIAGNOSIS — Z87.42 PERSONAL HISTORY OF OTHER DISEASES OF THE FEMALE GENITAL TRACT: ICD-10-CM

## 2023-06-19 LAB
HEPATITIS B SURFACE ANTIGEN (EXTERNAL): NONREACTIVE
HEPATITIS C ANTIBODY (EXTERNAL): NONREACTIVE
HIV1+2 AB SERPL QL IA: NONREACTIVE
RUBELLA ANTIBODY IGG (EXTERNAL): POSITIVE
TREPONEMA PALLIDUM ANTIBODY (EXTERNAL): NONREACTIVE

## 2023-06-19 PROCEDURE — 84439 ASSAY OF FREE THYROXINE: CPT | Performed by: NURSE PRACTITIONER

## 2023-06-19 PROCEDURE — 86592 SYPHILIS TEST NON-TREP QUAL: CPT | Performed by: NURSE PRACTITIONER

## 2023-06-19 PROCEDURE — 87389 HIV-1 AG W/HIV-1&-2 AB AG IA: CPT | Performed by: NURSE PRACTITIONER

## 2023-06-19 PROCEDURE — 84443 ASSAY THYROID STIM HORMONE: CPT | Performed by: NURSE PRACTITIONER

## 2023-06-19 PROCEDURE — 84481 FREE ASSAY (FT-3): CPT | Performed by: NURSE PRACTITIONER

## 2023-06-19 PROCEDURE — 80081 OBSTETRIC PANEL INC HIV TSTG: CPT | Performed by: NURSE PRACTITIONER

## 2023-06-19 PROCEDURE — 86803 HEPATITIS C AB TEST: CPT | Performed by: NURSE PRACTITIONER

## 2023-06-19 PROCEDURE — 87340 HEPATITIS B SURFACE AG IA: CPT | Performed by: NURSE PRACTITIONER

## 2023-06-19 PROCEDURE — 87086 URINE CULTURE/COLONY COUNT: CPT | Performed by: NURSE PRACTITIONER

## 2023-06-19 PROCEDURE — 86901 BLOOD TYPING SEROLOGIC RH(D): CPT | Performed by: NURSE PRACTITIONER

## 2023-06-19 PROCEDURE — 86850 RBC ANTIBODY SCREEN: CPT | Performed by: NURSE PRACTITIONER

## 2023-06-20 LAB
BASOPHILS # BLD AUTO: 0.1 10E3/UL (ref 0–0.2)
BASOPHILS NFR BLD AUTO: 1 %
EOSINOPHIL # BLD AUTO: 0 10E3/UL (ref 0–0.7)
EOSINOPHIL NFR BLD AUTO: 1 %
ERYTHROCYTE [DISTWIDTH] IN BLOOD BY AUTOMATED COUNT: 12.8 % (ref 10–15)
HBV SURFACE AG SERPL QL IA: NONREACTIVE
HCT VFR BLD AUTO: 38.4 % (ref 35–47)
HCV AB SERPL QL IA: NONREACTIVE
HGB BLD-MCNC: 12.2 G/DL (ref 11.7–15.7)
HIV 1+2 AB+HIV1 P24 AG SERPL QL IA: NONREACTIVE
IMM GRANULOCYTES # BLD: 0 10E3/UL
IMM GRANULOCYTES NFR BLD: 0 %
LYMPHOCYTES # BLD AUTO: 2.3 10E3/UL (ref 0.8–5.3)
LYMPHOCYTES NFR BLD AUTO: 31 %
MCH RBC QN AUTO: 30.2 PG (ref 26.5–33)
MCHC RBC AUTO-ENTMCNC: 31.8 G/DL (ref 31.5–36.5)
MCV RBC AUTO: 95 FL (ref 78–100)
MONOCYTES # BLD AUTO: 0.5 10E3/UL (ref 0–1.3)
MONOCYTES NFR BLD AUTO: 7 %
NEUTROPHILS # BLD AUTO: 4.4 10E3/UL (ref 1.6–8.3)
NEUTROPHILS NFR BLD AUTO: 60 %
NRBC # BLD AUTO: 0 10E3/UL
NRBC BLD AUTO-RTO: 0 /100
PLATELET # BLD AUTO: 208 10E3/UL (ref 150–450)
RBC # BLD AUTO: 4.04 10E6/UL (ref 3.8–5.2)
T3FREE SERPL-MCNC: 2.5 PG/ML (ref 2–4.4)
T4 FREE SERPL-MCNC: 1.26 NG/DL (ref 0.9–1.7)
TSH SERPL DL<=0.005 MIU/L-ACNC: 0.71 UIU/ML (ref 0.3–4.2)
WBC # BLD AUTO: 7.3 10E3/UL (ref 4–11)

## 2023-06-21 LAB
ABO/RH(D): NORMAL
ANTIBODY SCREEN: NEGATIVE
RPR SER QL: NONREACTIVE
RUBV IGG SERPL QL IA: 2.97 INDEX
RUBV IGG SERPL QL IA: POSITIVE
SPECIMEN EXPIRATION DATE: NORMAL

## 2023-06-22 LAB — BACTERIA UR CULT: NO GROWTH

## 2023-07-13 ENCOUNTER — LAB REQUISITION (OUTPATIENT)
Dept: LAB | Facility: CLINIC | Age: 43
End: 2023-07-13

## 2023-07-13 DIAGNOSIS — Z12.4 ENCOUNTER FOR SCREENING FOR MALIGNANT NEOPLASM OF CERVIX: ICD-10-CM

## 2023-07-13 PROCEDURE — G0145 SCR C/V CYTO,THINLAYER,RESCR: HCPCS | Performed by: OBSTETRICS & GYNECOLOGY

## 2023-07-18 LAB
BKR LAB AP GYN ADEQUACY: NORMAL
BKR LAB AP GYN INTERPRETATION: NORMAL
BKR LAB AP HPV REFLEX: NORMAL
BKR LAB AP LMP: NORMAL
BKR LAB AP PREVIOUS ABNL DX: NORMAL
BKR LAB AP PREVIOUS ABNORMAL: NORMAL
PATH REPORT.COMMENTS IMP SPEC: NORMAL
PATH REPORT.COMMENTS IMP SPEC: NORMAL
PATH REPORT.RELEVANT HX SPEC: NORMAL

## 2023-08-10 ENCOUNTER — LAB REQUISITION (OUTPATIENT)
Dept: LAB | Facility: CLINIC | Age: 43
End: 2023-08-10

## 2023-08-10 DIAGNOSIS — Z3A.17 17 WEEKS GESTATION OF PREGNANCY: ICD-10-CM

## 2023-08-10 PROCEDURE — 82105 ALPHA-FETOPROTEIN SERUM: CPT | Performed by: NURSE PRACTITIONER

## 2023-08-13 LAB
# FETUSES US: NORMAL
AFP MOM SERPL: 1.37
AFP SERPL-MCNC: 50 NG/ML
AGE - REPORTED: 42.6 YR
CURRENT SMOKER: NO
FAMILY MEMBER DISEASES HX: NO
GA METHOD: NORMAL
GA: NORMAL WK
IDDM PATIENT QL: NO
INTEGRATED SCN PATIENT-IMP: NORMAL
SPECIMEN DRAWN SERPL: NORMAL

## 2023-10-19 ENCOUNTER — LAB REQUISITION (OUTPATIENT)
Dept: LAB | Facility: CLINIC | Age: 43
End: 2023-10-19

## 2023-10-19 ENCOUNTER — TRANSFERRED RECORDS (OUTPATIENT)
Dept: HEALTH INFORMATION MANAGEMENT | Facility: CLINIC | Age: 43
End: 2023-10-19
Payer: COMMERCIAL

## 2023-10-19 DIAGNOSIS — Z11.3 ENCOUNTER FOR SCREENING FOR INFECTIONS WITH A PREDOMINANTLY SEXUAL MODE OF TRANSMISSION: ICD-10-CM

## 2023-10-19 PROCEDURE — 86780 TREPONEMA PALLIDUM: CPT | Performed by: NURSE PRACTITIONER

## 2023-10-20 LAB — T PALLIDUM AB SER QL: NONREACTIVE

## 2023-11-02 ENCOUNTER — MEDICAL CORRESPONDENCE (OUTPATIENT)
Dept: HEALTH INFORMATION MANAGEMENT | Facility: CLINIC | Age: 43
End: 2023-11-02
Payer: COMMERCIAL

## 2023-11-08 ENCOUNTER — ALLIED HEALTH/NURSE VISIT (OUTPATIENT)
Dept: EDUCATION SERVICES | Facility: CLINIC | Age: 43
End: 2023-11-08
Payer: COMMERCIAL

## 2023-11-08 DIAGNOSIS — O24.414 INSULIN CONTROLLED GESTATIONAL DIABETES MELLITUS (GDM) DURING PREGNANCY, ANTEPARTUM: Primary | ICD-10-CM

## 2023-11-08 PROCEDURE — G0108 DIAB MANAGE TRN  PER INDIV: HCPCS | Performed by: DIETITIAN, REGISTERED

## 2023-11-08 RX ORDER — INSULIN HUMAN 100 [IU]/ML
12 INJECTION, SUSPENSION SUBCUTANEOUS AT BEDTIME
Qty: 15 ML | Refills: 1 | Status: SHIPPED | OUTPATIENT
Start: 2023-11-08 | End: 2023-11-09

## 2023-11-08 RX ORDER — ACYCLOVIR 400 MG/1
1 TABLET ORAL
Qty: 3 EACH | Refills: 5 | Status: SHIPPED | OUTPATIENT
Start: 2023-11-08

## 2023-11-08 RX ORDER — BLOOD PRESSURE TEST KIT
1 KIT MISCELLANEOUS DAILY
Qty: 100 EACH | Refills: 1 | Status: SHIPPED | OUTPATIENT
Start: 2023-11-08

## 2023-11-08 RX ORDER — PEN NEEDLE, DIABETIC 30 GX3/16"
1 NEEDLE, DISPOSABLE MISCELLANEOUS DAILY
Qty: 100 EACH | Refills: 1 | Status: SHIPPED | OUTPATIENT
Start: 2023-11-08

## 2023-11-08 RX ORDER — LANCETS
EACH MISCELLANEOUS
Qty: 100 EACH | Refills: 6 | Status: SHIPPED | OUTPATIENT
Start: 2023-11-08

## 2023-11-08 NOTE — LETTER
"    11/8/2023         RE: Rachel ABRAN Jeannette  [address] [place] [address]  [facility] [address]        Dear Colleague,    Thank you for referring your patient, Rachel Machado, to the [facility]. Please see a copy of my visit note below.    Diabetes Self-Management Education & Support/ 60 minutes  Type of Service: In Person Visit    SUBJECTIVE/OBJECTIVE:  Presents for education related to gestational diabetes.    Accompanied by: Self  Gestational weeks: 28 w 5 d  Next OB Visit Date: 11/22/23  Number of previous pregnancies:  (no full term pregnancies)  Have you ever had thyroid problems or taken thyroid medication?: (!) Yes  Heart disease, mitral valve prolapse or rheumatic fever?: No  Hypertension : No  High Cholesterol: No  High Triglycerides: No  Do you use tobacco products?: No  Do you drink beer, wine or hard liquor?: No    Cultural Influences/Ethnic Background:  Not  or       Estimated Date of Delivery: 1/26/24  Next OB visit: 11/22/23     Blood Glucose Log 10/26-11/3:  No documentation was noted as to whether PP readings were 1 hr or 2 hr PP.     Date Ketones Fasting Post Breakfast Post Lunch Post Supper   10/26   146  146   10/27  120  135 133   10/28  112  126 125   10/29    134    10/30    124 113   10/31  117 116 154    11/1  119  106 145   11/2  135  143    11/3  116  104 133                1 hour OGTT  No results found for: \"GLU1\"      3 hour OGTT    Fasting  No results found for: \"GTTGF\"    1 hour  No results found for: \"GTTG1\"    2 hour  No results found for: \"GTTG2\"    3 hour  No results found for: \"GTTG3\"    Lifestyle and Health Behaviors:  Pre-pregnancy weight (lbs): 125 (most current appointment- 150#)  Exercise:: Yes  Days per week of moderate to strenuous exercise (like a brisk walk): 4  On average, minutes per day of exercise at this level: 40  Exercise Minutes per Week: 160  Barrier to exercise: None  Meal planning/habits: Carb counting  Meals " include: Breakfast, Lunch, Dinner  Beverages: Water, Coffee  Pre-sanjeev vitamin?: Yes  Supplements?: Yes  List supplements currently taking: fish oil, baby aspirin, magnesium and vitamin B6  Experiencing nausea?: No  Experiencing heartburn?: No    Healthy Coping:  Emotional response to diabetes: Ready to learn  Informal Support system:: Significant other, Family    Current Management:       ASSESSMENT:  Initial GDM education for Rachel, who has already been reading about GDM and joined an online support group. She purchased a meter and started checking her BG.  FBS are elevated and she does have elevated PP readings, although it was not noted whether readings were 1 hr or 2 hr PP> Patient is exercising 4-5x/week, walking for 40-45 minutes. She is a , so she is active during the day. She is consuming three meals/day, healthy food choices and she is not using regular sugar in her food preparation. She does not consume sugary beverages.     INTERVENTION:  We reviewed how to do an insulin injection: site selection, storage and expiration of insulin, dosing and timing of insulin injection. Patient had no questions or concerns.       Educational topics covered today:  GDM diagnosis, pathophysiology, Risks and Complications of GDM, Means of controlling GDM, Using a Blood Glucose Monitor, Blood Glucose Goals, Logging and Interpreting Glucose Results, Ketone Testing, When to Call a Diabetes Educator or OB Provider, Healthy Eating During Pregnancy, Counting Carbohydrates, Meal Planning for GDM, and Physical Activity    Educational materials provided today:   [place] Understanding Gestational Diabetes  GDM Log Book  Sharps Disposal  Care After Delivery      Pt verbalized understanding of concepts discussed and recommendations provided today.     PLAN:  Start insulin per protocol at bedtime: 12 units daily, increase 2 units every 2 days until FBS is between 85-94 mg/dl    Check glucose 4 times daily, before  breakfast and 1 hour after each meal.     Check Ketones daily for one week, if negative, reduce testing to once a week.     Physical activity recommended: continue with current exercise routine.    Meal plan: 30 carbs at breakfast, 45-60 carbs at lunch, 45-60 carbs at supper, 15-30 carbs at 3 snacks a day.  Follow consistent CHO meal plan, eat CHO and protein/fat at all meals/snacks.    Call [phone] for questions or concerns    Follow up with educator on 11/14/23  Time Spent: 60 minutes  Encounter Type: Individual    Any diabetes medication dose changes were made via the CDE Protocol and Collaborative Practice Agreement with the patient's OB/GYN provider. A copy of this encounter was shared with the provider.

## 2023-11-08 NOTE — PROGRESS NOTES
"Diabetes Self-Management Education & Support/ 60 minutes  Type of Service: In Person Visit    SUBJECTIVE/OBJECTIVE:  Presents for education related to gestational diabetes.    Accompanied by: Self  Gestational weeks: 28 w 5 d  Next OB Visit Date: 23  Number of previous pregnancies:  (no full term pregnancies)  Have you ever had thyroid problems or taken thyroid medication?: (!) Yes  Heart disease, mitral valve prolapse or rheumatic fever?: No  Hypertension : No  High Cholesterol: No  High Triglycerides: No  Do you use tobacco products?: No  Do you drink beer, wine or hard liquor?: No    Cultural Influences/Ethnic Background:  Not  or       Estimated Date of Delivery: 24  Next OB visit: 23     Blood Glucose Log 10/26-11/3:  No documentation was noted as to whether PP readings were 1 hr or 2 hr PP.     Date Ketones Fasting Post Breakfast Post Lunch Post Supper   10/26   146  146   10/27  120  135 133   10/28  112  126 125   10/29    134    10/30    124 113   10/31  117 116 154      119  106 145     135  143    11/3  116  104 133                1 hour OGTT  No results found for: \"GLU1\"      3 hour OGTT    Fasting  No results found for: \"GTTGF\"    1 hour  No results found for: \"GTTG1\"    2 hour  No results found for: \"GTTG2\"    3 hour  No results found for: \"GTTG3\"    Lifestyle and Health Behaviors:  Pre-pregnancy weight (lbs): 125 (most current appointment- 150#)  Exercise:: Yes  Days per week of moderate to strenuous exercise (like a brisk walk): 4  On average, minutes per day of exercise at this level: 40  Exercise Minutes per Week: 160  Barrier to exercise: None  Meal planning/habits: Carb counting  Meals include: Breakfast, Lunch, Dinner  Beverages: Water, Coffee  Pre- vitamin?: Yes  Supplements?: Yes  List supplements currently taking: fish oil, baby aspirin, magnesium and vitamin B6  Experiencing nausea?: No  Experiencing heartburn?: No    Healthy Coping:  Emotional " response to diabetes: Ready to learn  Informal Support system:: Significant other, Family    Current Management:       ASSESSMENT:  Initial GDM education for Rachel, who has already been reading about GDM and joined an online support group. She purchased a meter and started checking her BG.  FBS are elevated and she does have elevated PP readings, although it was not noted whether readings were 1 hr or 2 hr PP> Patient is exercising 4-5x/week, walking for 40-45 minutes. She is a , so she is active during the day. She is consuming three meals/day, healthy food choices and she is not using regular sugar in her food preparation. She does not consume sugary beverages.     INTERVENTION:  We reviewed how to do an insulin injection: site selection, storage and expiration of insulin, dosing and timing of insulin injection. Patient had no questions or concerns.       Educational topics covered today:  GDM diagnosis, pathophysiology, Risks and Complications of GDM, Means of controlling GDM, Using a Blood Glucose Monitor, Blood Glucose Goals, Logging and Interpreting Glucose Results, Ketone Testing, When to Call a Diabetes Educator or OB Provider, Healthy Eating During Pregnancy, Counting Carbohydrates, Meal Planning for GDM, and Physical Activity    Educational materials provided today:   [place] Understanding Gestational Diabetes  GDM Log Book  Sharps Disposal  Care After Delivery      Pt verbalized understanding of concepts discussed and recommendations provided today.     PLAN:  Start insulin per protocol at bedtime: 12 units daily, increase 2 units every 2 days until FBS is between 85-94 mg/dl    Check glucose 4 times daily, before breakfast and 1 hour after each meal.     Check Ketones daily for one week, if negative, reduce testing to once a week.     Physical activity recommended: continue with current exercise routine.    Meal plan: 30 carbs at breakfast, 45-60 carbs at lunch, 45-60 carbs at  supper, 15-30 carbs at 3 snacks a day.  Follow consistent CHO meal plan, eat CHO and protein/fat at all meals/snacks.    Call [phone] for questions or concerns    Follow up with educator on 11/14/23  Time Spent: 60 minutes  Encounter Type: Individual    Any diabetes medication dose changes were made via the CDE Protocol and Collaborative Practice Agreement with the patient's OB/GYN provider. A copy of this encounter was shared with the provider.

## 2023-11-08 NOTE — PATIENT INSTRUCTIONS
Download the Dexcom G7 merlene and Dexcom clarity merlene.  Look for an email from dexcom Clarity to join to the [facility]  NI customer service: [phone]  Start insulin at bedtime.  Test glucose 4x/day: fasting and 1-2 hours after meals.  Check ketones daily.  Limit carbohydrate to 30 grams for breakfast, 45-60 grams for lunch and dinner, and 15-30 grams per snack.   Continue to exercise unless your doctor suggests otherwise.

## 2023-11-09 ENCOUNTER — TELEPHONE (OUTPATIENT)
Dept: EDUCATION SERVICES | Facility: CLINIC | Age: 43
End: 2023-11-09
Payer: COMMERCIAL

## 2023-11-09 DIAGNOSIS — O24.414 INSULIN CONTROLLED GESTATIONAL DIABETES MELLITUS (GDM) DURING PREGNANCY, ANTEPARTUM: ICD-10-CM

## 2023-11-09 RX ORDER — INSULIN HUMAN 100 [IU]/ML
12 INJECTION, SUSPENSION SUBCUTANEOUS AT BEDTIME
Qty: 15 ML | Refills: 1 | Status: ON HOLD | OUTPATIENT
Start: 2023-11-09 | End: 2024-01-26

## 2023-11-09 NOTE — TELEPHONE ENCOUNTER
Spoke w/ pt. She was wondering if she should check BG from when she starts eating or finished. Advised from start. Pt had no other questions.

## 2023-11-13 ENCOUNTER — TELEPHONE (OUTPATIENT)
Dept: ENDOCRINOLOGY | Facility: CLINIC | Age: 43
End: 2023-11-13
Payer: COMMERCIAL

## 2023-11-13 NOTE — TELEPHONE ENCOUNTER
----- Message from Riya Kulkarni RN sent at 11/10/2023  9:02 AM CST -----  Regarding: FW: GDM patient/insulin start  Hi,  Could you please schedule her with me for the weeks between now and when she meets with Dayna?  I can do video or in person.  Wednesdays  8:30 or 9 or 9:30 or 2:30  Thanks,  Monalisa

## 2023-11-22 ENCOUNTER — ALLIED HEALTH/NURSE VISIT (OUTPATIENT)
Dept: EDUCATION SERVICES | Facility: CLINIC | Age: 43
End: 2023-11-22
Payer: COMMERCIAL

## 2023-11-22 VITALS — WEIGHT: 134 LBS | BODY MASS INDEX: 24.51 KG/M2

## 2023-11-22 DIAGNOSIS — O24.414 INSULIN CONTROLLED GESTATIONAL DIABETES MELLITUS (GDM) DURING PREGNANCY, ANTEPARTUM: Primary | ICD-10-CM

## 2023-11-22 PROCEDURE — G0108 DIAB MANAGE TRN  PER INDIV: HCPCS

## 2023-11-22 NOTE — LETTER
11/22/2023         RE: Rachel Machado  [address]  [place] [address]        Dear Colleague,    Thank you for referring your patient, Rachel Machado, to the [facility]. Please see a copy of my visit note below.    Diabetes Self-Management and Care Support    Rachel is here alone today for her follow up on Gestational Diabetes.  She is currently taking insulin to help control her glucose.  She is feeling okay.  Stated she is not having swelling in her hands, last 2 weeks mild swelling swelling in her feet and her baby is moving well.  Stated she does not know the sex of her baby.  Saturday was her baby shower and her readings got quite high.  Discussed monitoring this closer if she has another shower.  Stated she started checking her glucose before she met with education the first time and her fasting readings were high.  She started using a new meter and the sensor and readings were 30 points lower.  Stated her sensor was alarming she was low frequently and she dropped her insulin dose to 10 units, then 8 units.  This morning she had another episode of low glucose.  Discussed stopping insulin for now.  Will follow up with her next week for glucose review and discuss possibility of needing low dose insulin if readings are high.  She is okay with this plan.    OB-Princess Edwards  EDC-1.26.24-30 weeks 5 days  Pregnancy number-1  OGTT-65/189/169/--  Previous GDM-No    Current insulin doses  Basal NPH 8 units  Prandial not at this time    Current blood sugars                        Plan-  Stop insulin.  Continue following meal plan.  Call if there is any change in readings before next appointment.  Follow up scheduled for Follow up via telephone next week and 2 weeks with endocrinology.       The service provided today was under the supervising provider, Kwasi Grant, who was available if needed.       Monalisa Kulkarni RN, [facility]  [phone]  In Clinic  Tuesday, Wednesday, Thursday  In person   DSMT 30 minutes

## 2023-11-22 NOTE — PROGRESS NOTES
Diabetes Self-Management and Care Support    Rachel is here alone today for her follow up on Gestational Diabetes.  She is currently taking insulin to help control her glucose.  She is feeling okay.  Stated she is not having swelling in her hands, last 2 weeks mild swelling swelling in her feet and her baby is moving well.  Stated she does not know the sex of her baby.  Saturday was her baby shower and her readings got quite high.  Discussed monitoring this closer if she has another shower.  Stated she started checking her glucose before she met with education the first time and her fasting readings were high.  She started using a new meter and the sensor and readings were 30 points lower.  Stated her sensor was alarming she was low frequently and she dropped her insulin dose to 10 units, then 8 units.  This morning she had another episode of low glucose.  Discussed stopping insulin for now.  Will follow up with her next week for glucose review and discuss possibility of needing low dose insulin if readings are high.  She is okay with this plan.    OB-Princess Edwards  EDC-1.26.24-30 weeks 5 days  Pregnancy number-1  OGTT-65/189/169/--  Previous GDM-No    Current insulin doses  Basal NPH 8 units  Prandial not at this time    Current blood sugars                        Plan-  Stop insulin.  Continue following meal plan.  Call if there is any change in readings before next appointment.  Follow up scheduled for Follow up via telephone next week and 2 weeks with endocrinology.       The service provided today was under the supervising provider, Kwasi Grant, who was available if needed.       Monalisa Kulkarni RN, [facility]  [phone]  In Clinic Tuesday, Wednesday, Thursday  In person   DSMT 30 minutes

## 2023-11-28 ENCOUNTER — HOSPITAL ENCOUNTER (OUTPATIENT)
Facility: HOSPITAL | Age: 43
Discharge: HOME | End: 2023-11-28
Attending: OBSTETRICS & GYNECOLOGY | Admitting: OBSTETRICS & GYNECOLOGY
Payer: COMMERCIAL

## 2023-11-28 ENCOUNTER — TELEPHONE (OUTPATIENT)
Dept: EDUCATION SERVICES | Facility: CLINIC | Age: 43
End: 2023-11-28
Payer: COMMERCIAL

## 2023-11-28 ENCOUNTER — HOSPITAL ENCOUNTER (OUTPATIENT)
Facility: HOSPITAL | Age: 43
End: 2023-11-28
Admitting: OBSTETRICS & GYNECOLOGY
Payer: COMMERCIAL

## 2023-11-28 PROCEDURE — G0463 HOSPITAL OUTPT CLINIC VISIT: HCPCS

## 2023-11-28 RX ORDER — ASPIRIN 81 MG/1
81 TABLET ORAL DAILY
COMMUNITY

## 2023-11-28 RX ORDER — LEVOTHYROXINE SODIUM 25 UG/1
25 TABLET ORAL DAILY
COMMUNITY

## 2023-11-28 ASSESSMENT — ACTIVITIES OF DAILY LIVING (ADL): ADLS_ACUITY_SCORE: 20

## 2023-11-28 NOTE — TELEPHONE ENCOUNTER
Monalisa Kulkarni RN, [facility]  [phone]  In Clinic Tuesday through Friday

## 2023-11-29 NOTE — PROGRESS NOTES
Data: Patient presented to [facility]: 2023  8:04 PM.  Reason for maternal/fetal assessment is decreased fetal movement. Patient reports she has not felt her baby move much in the last 24 hours. Patient denies uterine contractions, leaking of vaginal fluid/rupture of membranes, vaginal bleeding, abdominal pain, pelvic pressure. Patient reports fetal movement is decreased. Patient is a 31w4d .  Prenatal record reviewed. Pregnancy has been complicated by gestational diabetes.     Vital signs wnl. Support person is not present.     Action: Verbal consent for EFM. Triage assessment completed.     Response: Patient verbalized agreement with plan. Will contact Dr. Paulino the [facility] with update and further orders.

## 2023-11-29 NOTE — DISCHARGE INSTRUCTIONS
Discharge Instruction for Undelivered Patients      You were seen for:  Decreased fetal movement  We Consulted: Dr. conley  You had (Test or Medicine):Monitoring of fetal heart rate         Call your provider if you notice:  Swelling in your face or increased swelling in your hands or legs.  Headaches that are not relieved by Tylenol (acetaminophen).  Changes in your vision (blurring: seeing spots or stars.)  Nausea (sick to your stomach) and vomiting (throwing up).   Weight gain of 5 pounds or more per week.  Heartburn that doesn't go away.  Signs of bladder infection: pain when you urinate (use the toilet), need to go more often and more urgently.  The bag of addison (rupture of membranes) breaks, or you notice leaking in your underwear.  Bright red blood in your underwear.  Abdominal (lower belly) or stomach pain.  For first baby: Contractions (tightening) less than 5 minutes apart for one hour or more.  Second (plus) baby: Contractions (tightening) less than 10 minutes apart and getting stronger.  *If less than 34 weeks: Contractions (tightening) more than 6 times in one hour.  Increase or change in vaginal discharge (note the color and amount)      Follow-up:  As scheduled in the clinic

## 2023-11-29 NOTE — PROGRESS NOTES
[facility] updated on fetal monitoring strip, category 1 tracing noted with moderate variability and no decels. Discharge orders received. Patient given discharge instructions and warning signs, understanding verbalized.

## 2023-11-29 NOTE — PROGRESS NOTES
[facility] updated on patient's arrival, plan to monitor EFM for next hour and update [facility] for further orders.

## 2023-12-06 ENCOUNTER — VIRTUAL VISIT (OUTPATIENT)
Dept: EDUCATION SERVICES | Facility: CLINIC | Age: 43
End: 2023-12-06
Payer: COMMERCIAL

## 2023-12-06 DIAGNOSIS — O24.410 DIET CONTROLLED GESTATIONAL DIABETES MELLITUS (GDM) IN THIRD TRIMESTER: Primary | ICD-10-CM

## 2023-12-06 PROBLEM — N97.9 FEMALE INFERTILITY: Status: ACTIVE | Noted: 2023-12-06

## 2023-12-06 PROBLEM — O24.419 GESTATIONAL DIABETES MELLITUS: Status: ACTIVE | Noted: 2023-10-26

## 2023-12-06 PROBLEM — R39.9 SYMPTOMS INVOLVING URINARY SYSTEM: Status: ACTIVE | Noted: 2023-11-15

## 2023-12-06 PROBLEM — O09.529 MULTIGRAVIDA OF ADVANCED MATERNAL AGE: Status: ACTIVE | Noted: 2023-12-06

## 2023-12-06 PROCEDURE — 98966 PH1 ASSMT&MGMT NQHP 5-10: CPT | Mod: 93

## 2023-12-06 NOTE — LETTER
12/6/2023         RE: Rachel Machado  [address]  [place] [facility] [place] [facility] [address]        Dear Colleague,    Thank you for referring your patient, Rachel Machado, to the [facility]. Please see a copy of my visit note below.    Diabetes Self-Management and Care Support    Met with Rachel via telephone for her follow up on Gestational Diabetes.  We could not get the video link to work well.  She is currently not taking insulin to help control her glucose.  She is feeling good.  Stated she is not having swelling in her hands, not having swelling in her feet and her baby is moving well.  Rachel was taken off insulin two weeks ago and her readings have been good since.  She is not having the trouble with lows she was having before and no post prandial highs have been noted.  We discussed following up via telephone in 2 weeks after reviewing her Dexcom and toward the end of her pregnancy as to what to expect at the hospital.  All questions and concerns addressed today.    OB-Jazlyn Edwards  EDC-1.26.24-32 weeks 5 days  Pregnancy number-1  OGTT-65/189/169/--  Previous GDM-No    Current insulin doses  Basal None at this time  Prandial none at this time    Current blood sugars            Plan-  Continue with current plan.  Call if there is any change in readings before next appointment.  Follow up scheduled for 2 weeks       The service provided today was under the supervising provider, Anahi Lockwood, who was available if needed.       Monalisa Kulkarni RN, [facility]  [phone]  In Clinic Tuesday, Wednesday, Thursday  telephone  DSMT 6 minutes

## 2023-12-06 NOTE — PROGRESS NOTES
Diabetes Self-Management and Care Support    Met with Rachel via telephone for her follow up on Gestational Diabetes.  We could not get the video link to work well.  She is currently not taking insulin to help control her glucose.  She is feeling good.  Stated she is not having swelling in her hands, not having swelling in her feet and her baby is moving well.  Rachel was taken off insulin two weeks ago and her readings have been good since.  She is not having the trouble with lows she was having before and no post prandial highs have been noted.  We discussed following up via telephone in 2 weeks after reviewing her Dexcom and toward the end of her pregnancy as to what to expect at the hospital.  All questions and concerns addressed today.    OB-Jazlynjeferson Edwards  EDC-1.26.24-32 weeks 5 days  Pregnancy number-1  OGTT-65/189/169/--  Previous GDM-No    Current insulin doses  Basal None at this time  Prandial none at this time    Current blood sugars            Plan-  Continue with current plan.  Call if there is any change in readings before next appointment.  Follow up scheduled for 2 weeks       The service provided today was under the supervising provider, Anahi Lockwood, who was available if needed.       Monalisa Kulkarni RN, [facility]  [phone]  In Clinic Tuesday, Wednesday, Thursday  telephone  DSMT 6 minutes

## 2024-01-03 ENCOUNTER — LAB REQUISITION (OUTPATIENT)
Dept: LAB | Facility: CLINIC | Age: 44
End: 2024-01-03
Payer: COMMERCIAL

## 2024-01-03 DIAGNOSIS — Z36.85 ENCOUNTER FOR ANTENATAL SCREENING FOR STREPTOCOCCUS B: ICD-10-CM

## 2024-01-03 PROCEDURE — 87653 STREP B DNA AMP PROBE: CPT | Performed by: NURSE PRACTITIONER

## 2024-01-03 PROCEDURE — 87653 STREP B DNA AMP PROBE: CPT | Mod: ORL | Performed by: NURSE PRACTITIONER

## 2024-01-04 LAB — GP B STREP DNA SPEC QL NAA+PROBE: NEGATIVE

## 2024-01-09 ENCOUNTER — TELEPHONE (OUTPATIENT)
Dept: EDUCATION SERVICES | Facility: CLINIC | Age: 44
End: 2024-01-09
Payer: COMMERCIAL

## 2024-01-09 NOTE — TELEPHONE ENCOUNTER
[facility] Call [facility]    Phone Message    May a detailed message be left on voicemail: yes     Reason for Call: Other: Patient is currently approx 3 weeks from due date.  She would like to discuss progression and has other questions prior to due date.      Action Taken: Other: endo    Travel Screening: Not Applicable

## 2024-01-22 ENCOUNTER — ANESTHESIA EVENT (OUTPATIENT)
Dept: OBGYN | Facility: HOSPITAL | Age: 44
End: 2024-01-22
Payer: COMMERCIAL

## 2024-01-22 ENCOUNTER — ANESTHESIA (OUTPATIENT)
Dept: OBGYN | Facility: HOSPITAL | Age: 44
End: 2024-01-22
Payer: COMMERCIAL

## 2024-01-22 ENCOUNTER — HOSPITAL ENCOUNTER (INPATIENT)
Facility: HOSPITAL | Age: 44
LOS: 4 days | Discharge: HOME HEALTH | End: 2024-01-26
Attending: OBSTETRICS & GYNECOLOGY | Admitting: OBSTETRICS & GYNECOLOGY
Payer: COMMERCIAL

## 2024-01-22 ENCOUNTER — TRANSFERRED RECORDS (OUTPATIENT)
Dept: HEALTH INFORMATION MANAGEMENT | Facility: CLINIC | Age: 44
End: 2024-01-22

## 2024-01-22 DIAGNOSIS — O09.529 ANTEPARTUM MULTIGRAVIDA OF ADVANCED MATERNAL AGE: ICD-10-CM

## 2024-01-22 DIAGNOSIS — O24.419 GESTATIONAL DIABETES MELLITUS: ICD-10-CM

## 2024-01-22 DIAGNOSIS — Z98.891 S/P CESAREAN SECTION: ICD-10-CM

## 2024-01-22 DIAGNOSIS — Z34.90 ENCOUNTER FOR ELECTIVE INDUCTION OF LABOR: Primary | ICD-10-CM

## 2024-01-22 DIAGNOSIS — O92.79 INSUFFICIENT LACTATION: ICD-10-CM

## 2024-01-22 LAB
ABO/RH(D): NORMAL
ANTIBODY SCREEN: NEGATIVE
GLUCOSE BLDC GLUCOMTR-MCNC: 67 MG/DL (ref 70–99)
HBA1C MFR BLD: 4.2 %
HGB BLD-MCNC: 11.5 G/DL (ref 11.7–15.7)
HOLD SPECIMEN: NORMAL
SPECIMEN EXPIRATION DATE: NORMAL

## 2024-01-22 PROCEDURE — 250N000011 HC RX IP 250 OP 636: Performed by: NURSE ANESTHETIST, CERTIFIED REGISTERED

## 2024-01-22 PROCEDURE — 36415 COLL VENOUS BLD VENIPUNCTURE: CPT | Performed by: OBSTETRICS & GYNECOLOGY

## 2024-01-22 PROCEDURE — 250N000011 HC RX IP 250 OP 636: Performed by: ANESTHESIOLOGY

## 2024-01-22 PROCEDURE — 258N000003 HC RX IP 258 OP 636: Performed by: OBSTETRICS & GYNECOLOGY

## 2024-01-22 PROCEDURE — 250N000009 HC RX 250: Performed by: NURSE ANESTHETIST, CERTIFIED REGISTERED

## 2024-01-22 PROCEDURE — 86900 BLOOD TYPING SEROLOGIC ABO: CPT | Performed by: OBSTETRICS & GYNECOLOGY

## 2024-01-22 PROCEDURE — 250N000011 HC RX IP 250 OP 636: Performed by: OBSTETRICS & GYNECOLOGY

## 2024-01-22 PROCEDURE — 360N000076 HC SURGERY LEVEL 3, PER MIN: Performed by: OBSTETRICS & GYNECOLOGY

## 2024-01-22 PROCEDURE — 272N000001 HC OR GENERAL SUPPLY STERILE: Performed by: OBSTETRICS & GYNECOLOGY

## 2024-01-22 PROCEDURE — 370N000017 HC ANESTHESIA TECHNICAL FEE, PER MIN: Performed by: OBSTETRICS & GYNECOLOGY

## 2024-01-22 PROCEDURE — 00HU33Z INSERTION OF INFUSION DEVICE INTO SPINAL CANAL, PERCUTANEOUS APPROACH: ICD-10-PCS | Performed by: ANESTHESIOLOGY

## 2024-01-22 PROCEDURE — 82962 GLUCOSE BLOOD TEST: CPT

## 2024-01-22 PROCEDURE — 250N000013 HC RX MED GY IP 250 OP 250 PS 637: Performed by: OBSTETRICS & GYNECOLOGY

## 2024-01-22 PROCEDURE — 3E0R3BZ INTRODUCTION OF ANESTHETIC AGENT INTO SPINAL CANAL, PERCUTANEOUS APPROACH: ICD-10-PCS | Performed by: ANESTHESIOLOGY

## 2024-01-22 PROCEDURE — 120N000001 HC R&B MED SURG/OB

## 2024-01-22 PROCEDURE — 85018 HEMOGLOBIN: CPT | Performed by: OBSTETRICS & GYNECOLOGY

## 2024-01-22 PROCEDURE — 83036 HEMOGLOBIN GLYCOSYLATED A1C: CPT | Performed by: OBSTETRICS & GYNECOLOGY

## 2024-01-22 PROCEDURE — 258N000003 HC RX IP 258 OP 636: Performed by: NURSE ANESTHETIST, CERTIFIED REGISTERED

## 2024-01-22 PROCEDURE — 999N000249 HC STATISTIC C-SECTION ON UNIT

## 2024-01-22 PROCEDURE — 250N000009 HC RX 250: Performed by: ANESTHESIOLOGY

## 2024-01-22 PROCEDURE — 999N000127 HC STATISTIC PERIPHERAL IV START W US GUIDANCE

## 2024-01-22 RX ORDER — OXYTOCIN 10 [USP'U]/ML
10 INJECTION, SOLUTION INTRAMUSCULAR; INTRAVENOUS
Status: DISCONTINUED | OUTPATIENT
Start: 2024-01-22 | End: 2024-01-22 | Stop reason: HOSPADM

## 2024-01-22 RX ORDER — ONDANSETRON 4 MG/1
4 TABLET, ORALLY DISINTEGRATING ORAL EVERY 30 MIN PRN
Status: DISCONTINUED | OUTPATIENT
Start: 2024-01-22 | End: 2024-01-26 | Stop reason: HOSPADM

## 2024-01-22 RX ORDER — IBUPROFEN 800 MG/1
800 TABLET, FILM COATED ORAL
Status: DISCONTINUED | OUTPATIENT
Start: 2024-01-22 | End: 2024-01-22

## 2024-01-22 RX ORDER — NALOXONE HYDROCHLORIDE 0.4 MG/ML
0.4 INJECTION, SOLUTION INTRAMUSCULAR; INTRAVENOUS; SUBCUTANEOUS
Status: DISCONTINUED | OUTPATIENT
Start: 2024-01-22 | End: 2024-01-22 | Stop reason: HOSPADM

## 2024-01-22 RX ORDER — OMEGA-3/DHA/EPA/FISH OIL 60 MG-90MG
1 CAPSULE ORAL DAILY
COMMUNITY

## 2024-01-22 RX ORDER — METHYLERGONOVINE MALEATE 0.2 MG/ML
200 INJECTION INTRAVENOUS
Status: DISCONTINUED | OUTPATIENT
Start: 2024-01-22 | End: 2024-01-22

## 2024-01-22 RX ORDER — OXYTOCIN/0.9 % SODIUM CHLORIDE 30/500 ML
100-340 PLASTIC BAG, INJECTION (ML) INTRAVENOUS CONTINUOUS PRN
Status: DISCONTINUED | OUTPATIENT
Start: 2024-01-22 | End: 2024-01-26 | Stop reason: HOSPADM

## 2024-01-22 RX ORDER — METOCLOPRAMIDE 10 MG/1
10 TABLET ORAL EVERY 6 HOURS PRN
Status: DISCONTINUED | OUTPATIENT
Start: 2024-01-22 | End: 2024-01-26 | Stop reason: HOSPADM

## 2024-01-22 RX ORDER — LOPERAMIDE HCL 2 MG
2 CAPSULE ORAL
Status: DISCONTINUED | OUTPATIENT
Start: 2024-01-22 | End: 2024-01-22

## 2024-01-22 RX ORDER — OXYTOCIN/0.9 % SODIUM CHLORIDE 30/500 ML
340 PLASTIC BAG, INJECTION (ML) INTRAVENOUS CONTINUOUS PRN
Status: DISCONTINUED | OUTPATIENT
Start: 2024-01-22 | End: 2024-01-22 | Stop reason: HOSPADM

## 2024-01-22 RX ORDER — METOCLOPRAMIDE HYDROCHLORIDE 5 MG/ML
10 INJECTION INTRAMUSCULAR; INTRAVENOUS EVERY 6 HOURS PRN
Status: DISCONTINUED | OUTPATIENT
Start: 2024-01-22 | End: 2024-01-22

## 2024-01-22 RX ORDER — LIDOCAINE 40 MG/G
CREAM TOPICAL
Status: DISCONTINUED | OUTPATIENT
Start: 2024-01-22 | End: 2024-01-22 | Stop reason: HOSPADM

## 2024-01-22 RX ORDER — MISOPROSTOL 200 UG/1
800 TABLET ORAL
Status: DISCONTINUED | OUTPATIENT
Start: 2024-01-22 | End: 2024-01-22 | Stop reason: HOSPADM

## 2024-01-22 RX ORDER — KETOROLAC TROMETHAMINE 30 MG/ML
30 INJECTION, SOLUTION INTRAMUSCULAR; INTRAVENOUS
Status: DISCONTINUED | OUTPATIENT
Start: 2024-01-22 | End: 2024-01-22

## 2024-01-22 RX ORDER — LIDOCAINE HYDROCHLORIDE 20 MG/ML
INJECTION, SOLUTION INFILTRATION; PERINEURAL PRN
Status: DISCONTINUED | OUTPATIENT
Start: 2024-01-22 | End: 2024-01-22

## 2024-01-22 RX ORDER — ONDANSETRON 2 MG/ML
4 INJECTION INTRAMUSCULAR; INTRAVENOUS EVERY 6 HOURS PRN
Status: DISCONTINUED | OUTPATIENT
Start: 2024-01-22 | End: 2024-01-22 | Stop reason: HOSPADM

## 2024-01-22 RX ORDER — NALOXONE HYDROCHLORIDE 0.4 MG/ML
0.4 INJECTION, SOLUTION INTRAMUSCULAR; INTRAVENOUS; SUBCUTANEOUS
Status: DISCONTINUED | OUTPATIENT
Start: 2024-01-22 | End: 2024-01-22

## 2024-01-22 RX ORDER — NALOXONE HYDROCHLORIDE 0.4 MG/ML
0.2 INJECTION, SOLUTION INTRAMUSCULAR; INTRAVENOUS; SUBCUTANEOUS
Status: DISCONTINUED | OUTPATIENT
Start: 2024-01-22 | End: 2024-01-22

## 2024-01-22 RX ORDER — HYDROXYZINE HYDROCHLORIDE 50 MG/1
50 TABLET, FILM COATED ORAL
Status: DISCONTINUED | OUTPATIENT
Start: 2024-01-22 | End: 2024-01-22 | Stop reason: HOSPADM

## 2024-01-22 RX ORDER — LIDOCAINE HCL/EPINEPHRINE/PF 2%-1:200K
VIAL (ML) INJECTION PRN
Status: DISCONTINUED | OUTPATIENT
Start: 2024-01-22 | End: 2024-01-22

## 2024-01-22 RX ORDER — SODIUM CHLORIDE, SODIUM LACTATE, POTASSIUM CHLORIDE, CALCIUM CHLORIDE 600; 310; 30; 20 MG/100ML; MG/100ML; MG/100ML; MG/100ML
INJECTION, SOLUTION INTRAVENOUS CONTINUOUS PRN
Status: DISCONTINUED | OUTPATIENT
Start: 2024-01-22 | End: 2024-01-22

## 2024-01-22 RX ORDER — OXYTOCIN 10 [USP'U]/ML
10 INJECTION, SOLUTION INTRAMUSCULAR; INTRAVENOUS
Status: DISCONTINUED | OUTPATIENT
Start: 2024-01-22 | End: 2024-01-26 | Stop reason: HOSPADM

## 2024-01-22 RX ORDER — MISOPROSTOL 200 UG/1
800 TABLET ORAL
Status: DISCONTINUED | OUTPATIENT
Start: 2024-01-22 | End: 2024-01-26 | Stop reason: HOSPADM

## 2024-01-22 RX ORDER — ACETAMINOPHEN 325 MG/1
975 TABLET ORAL EVERY 6 HOURS
Status: DISCONTINUED | OUTPATIENT
Start: 2024-01-23 | End: 2024-01-26 | Stop reason: HOSPADM

## 2024-01-22 RX ORDER — KETOROLAC TROMETHAMINE 30 MG/ML
30 INJECTION, SOLUTION INTRAMUSCULAR; INTRAVENOUS EVERY 6 HOURS
Qty: 3 ML | Refills: 0 | Status: DISPENSED | OUTPATIENT
Start: 2024-01-23 | End: 2024-01-23

## 2024-01-22 RX ORDER — TRANEXAMIC ACID 10 MG/ML
1 INJECTION, SOLUTION INTRAVENOUS EVERY 30 MIN PRN
Status: DISCONTINUED | OUTPATIENT
Start: 2024-01-22 | End: 2024-01-22 | Stop reason: HOSPADM

## 2024-01-22 RX ORDER — ONDANSETRON 4 MG/1
4 TABLET, ORALLY DISINTEGRATING ORAL EVERY 6 HOURS PRN
Status: DISCONTINUED | OUTPATIENT
Start: 2024-01-22 | End: 2024-01-22 | Stop reason: HOSPADM

## 2024-01-22 RX ORDER — CARBOPROST TROMETHAMINE 250 UG/ML
250 INJECTION, SOLUTION INTRAMUSCULAR
Status: DISCONTINUED | OUTPATIENT
Start: 2024-01-22 | End: 2024-01-26 | Stop reason: HOSPADM

## 2024-01-22 RX ORDER — METOCLOPRAMIDE 10 MG/1
10 TABLET ORAL EVERY 6 HOURS PRN
Status: DISCONTINUED | OUTPATIENT
Start: 2024-01-22 | End: 2024-01-22

## 2024-01-22 RX ORDER — ONDANSETRON 2 MG/ML
4 INJECTION INTRAMUSCULAR; INTRAVENOUS EVERY 6 HOURS PRN
Status: DISCONTINUED | OUTPATIENT
Start: 2024-01-22 | End: 2024-01-26 | Stop reason: HOSPADM

## 2024-01-22 RX ORDER — MISOPROSTOL 200 UG/1
400 TABLET ORAL
Status: DISCONTINUED | OUTPATIENT
Start: 2024-01-22 | End: 2024-01-26 | Stop reason: HOSPADM

## 2024-01-22 RX ORDER — CITRIC ACID/SODIUM CITRATE 334-500MG
30 SOLUTION, ORAL ORAL
Status: DISCONTINUED | OUTPATIENT
Start: 2024-01-22 | End: 2024-01-22

## 2024-01-22 RX ORDER — CITRIC ACID/SODIUM CITRATE 334-500MG
30 SOLUTION, ORAL ORAL
Status: COMPLETED | OUTPATIENT
Start: 2024-01-22 | End: 2024-01-22

## 2024-01-22 RX ORDER — CHLOROPROCAINE HYDROCHLORIDE 30 MG/ML
INJECTION, SOLUTION EPIDURAL; INFILTRATION; INTRACAUDAL; PERINEURAL PRN
Status: DISCONTINUED | OUTPATIENT
Start: 2024-01-22 | End: 2024-01-22

## 2024-01-22 RX ORDER — METHYLERGONOVINE MALEATE 0.2 MG/ML
200 INJECTION INTRAVENOUS
Status: DISCONTINUED | OUTPATIENT
Start: 2024-01-22 | End: 2024-01-22 | Stop reason: HOSPADM

## 2024-01-22 RX ORDER — MISOPROSTOL 200 UG/1
400 TABLET ORAL
Status: DISCONTINUED | OUTPATIENT
Start: 2024-01-22 | End: 2024-01-22 | Stop reason: HOSPADM

## 2024-01-22 RX ORDER — OXYTOCIN/0.9 % SODIUM CHLORIDE 30/500 ML
PLASTIC BAG, INJECTION (ML) INTRAVENOUS CONTINUOUS PRN
Status: DISCONTINUED | OUTPATIENT
Start: 2024-01-22 | End: 2024-01-22

## 2024-01-22 RX ORDER — LEVOTHYROXINE SODIUM 25 UG/1
25 TABLET ORAL
Status: DISCONTINUED | OUTPATIENT
Start: 2024-01-23 | End: 2024-01-26 | Stop reason: HOSPADM

## 2024-01-22 RX ORDER — SODIUM CHLORIDE, SODIUM LACTATE, POTASSIUM CHLORIDE, CALCIUM CHLORIDE 600; 310; 30; 20 MG/100ML; MG/100ML; MG/100ML; MG/100ML
INJECTION, SOLUTION INTRAVENOUS CONTINUOUS
Status: DISCONTINUED | OUTPATIENT
Start: 2024-01-22 | End: 2024-01-22 | Stop reason: HOSPADM

## 2024-01-22 RX ORDER — FENTANYL CITRATE-0.9 % NACL/PF 10 MCG/ML
100 PLASTIC BAG, INJECTION (ML) INTRAVENOUS EVERY 5 MIN PRN
Status: DISCONTINUED | OUTPATIENT
Start: 2024-01-22 | End: 2024-01-22 | Stop reason: HOSPADM

## 2024-01-22 RX ORDER — LOPERAMIDE HCL 2 MG
2 CAPSULE ORAL
Status: DISCONTINUED | OUTPATIENT
Start: 2024-01-22 | End: 2024-01-26 | Stop reason: HOSPADM

## 2024-01-22 RX ORDER — OXYTOCIN/0.9 % SODIUM CHLORIDE 30/500 ML
340 PLASTIC BAG, INJECTION (ML) INTRAVENOUS CONTINUOUS PRN
Status: DISCONTINUED | OUTPATIENT
Start: 2024-01-22 | End: 2024-01-26 | Stop reason: HOSPADM

## 2024-01-22 RX ORDER — TRANEXAMIC ACID 10 MG/ML
1 INJECTION, SOLUTION INTRAVENOUS EVERY 30 MIN PRN
Status: DISCONTINUED | OUTPATIENT
Start: 2024-01-22 | End: 2024-01-26 | Stop reason: HOSPADM

## 2024-01-22 RX ORDER — SODIUM CHLORIDE, SODIUM LACTATE, POTASSIUM CHLORIDE, CALCIUM CHLORIDE 600; 310; 30; 20 MG/100ML; MG/100ML; MG/100ML; MG/100ML
INJECTION, SOLUTION INTRAVENOUS CONTINUOUS
Status: DISCONTINUED | OUTPATIENT
Start: 2024-01-22 | End: 2024-01-26 | Stop reason: HOSPADM

## 2024-01-22 RX ORDER — METOCLOPRAMIDE 10 MG/1
10 TABLET ORAL EVERY 6 HOURS PRN
Status: DISCONTINUED | OUTPATIENT
Start: 2024-01-22 | End: 2024-01-22 | Stop reason: HOSPADM

## 2024-01-22 RX ORDER — LOPERAMIDE HCL 2 MG
4 CAPSULE ORAL
Status: DISCONTINUED | OUTPATIENT
Start: 2024-01-22 | End: 2024-01-22 | Stop reason: HOSPADM

## 2024-01-22 RX ORDER — PROCHLORPERAZINE MALEATE 10 MG
10 TABLET ORAL EVERY 6 HOURS PRN
Status: DISCONTINUED | OUTPATIENT
Start: 2024-01-22 | End: 2024-01-22 | Stop reason: HOSPADM

## 2024-01-22 RX ORDER — CARBOPROST TROMETHAMINE 250 UG/ML
250 INJECTION, SOLUTION INTRAMUSCULAR
Status: DISCONTINUED | OUTPATIENT
Start: 2024-01-22 | End: 2024-01-22 | Stop reason: HOSPADM

## 2024-01-22 RX ORDER — PROCHLORPERAZINE 25 MG
25 SUPPOSITORY, RECTAL RECTAL EVERY 12 HOURS PRN
Status: DISCONTINUED | OUTPATIENT
Start: 2024-01-22 | End: 2024-01-22 | Stop reason: HOSPADM

## 2024-01-22 RX ORDER — NICOTINE POLACRILEX 4 MG
15-30 LOZENGE BUCCAL
Status: DISCONTINUED | OUTPATIENT
Start: 2024-01-22 | End: 2024-01-22 | Stop reason: HOSPADM

## 2024-01-22 RX ORDER — FENTANYL CITRATE 50 UG/ML
50 INJECTION, SOLUTION INTRAMUSCULAR; INTRAVENOUS EVERY 30 MIN PRN
Status: DISCONTINUED | OUTPATIENT
Start: 2024-01-22 | End: 2024-01-22 | Stop reason: HOSPADM

## 2024-01-22 RX ORDER — NALOXONE HYDROCHLORIDE 0.4 MG/ML
0.2 INJECTION, SOLUTION INTRAMUSCULAR; INTRAVENOUS; SUBCUTANEOUS
Status: DISCONTINUED | OUTPATIENT
Start: 2024-01-22 | End: 2024-01-22 | Stop reason: HOSPADM

## 2024-01-22 RX ORDER — LOPERAMIDE HCL 2 MG
2 CAPSULE ORAL
Status: DISCONTINUED | OUTPATIENT
Start: 2024-01-22 | End: 2024-01-22 | Stop reason: HOSPADM

## 2024-01-22 RX ORDER — MODIFIED LANOLIN
OINTMENT (GRAM) TOPICAL
Status: DISCONTINUED | OUTPATIENT
Start: 2024-01-22 | End: 2024-01-26 | Stop reason: HOSPADM

## 2024-01-22 RX ORDER — KETOROLAC TROMETHAMINE 30 MG/ML
30 INJECTION, SOLUTION INTRAMUSCULAR; INTRAVENOUS
Status: DISCONTINUED | OUTPATIENT
Start: 2024-01-22 | End: 2024-01-26 | Stop reason: HOSPADM

## 2024-01-22 RX ORDER — CITRIC ACID/SODIUM CITRATE 334-500MG
30 SOLUTION, ORAL ORAL
Status: DISCONTINUED | OUTPATIENT
Start: 2024-01-22 | End: 2024-01-22 | Stop reason: HOSPADM

## 2024-01-22 RX ORDER — NALOXONE HYDROCHLORIDE 0.4 MG/ML
0.4 INJECTION, SOLUTION INTRAMUSCULAR; INTRAVENOUS; SUBCUTANEOUS
Status: DISCONTINUED | OUTPATIENT
Start: 2024-01-22 | End: 2024-01-26 | Stop reason: HOSPADM

## 2024-01-22 RX ORDER — IBUPROFEN 800 MG/1
800 TABLET, FILM COATED ORAL EVERY 6 HOURS
Status: DISCONTINUED | OUTPATIENT
Start: 2024-01-23 | End: 2024-01-26 | Stop reason: HOSPADM

## 2024-01-22 RX ORDER — FENTANYL CITRATE 50 UG/ML
INJECTION, SOLUTION INTRAMUSCULAR; INTRAVENOUS PRN
Status: DISCONTINUED | OUTPATIENT
Start: 2024-01-22 | End: 2024-01-22

## 2024-01-22 RX ORDER — METHYLERGONOVINE MALEATE 0.2 MG/ML
200 INJECTION INTRAVENOUS
Status: DISCONTINUED | OUTPATIENT
Start: 2024-01-22 | End: 2024-01-26 | Stop reason: HOSPADM

## 2024-01-22 RX ORDER — ACETAMINOPHEN 325 MG/1
650 TABLET ORAL EVERY 4 HOURS PRN
Status: DISCONTINUED | OUTPATIENT
Start: 2024-01-22 | End: 2024-01-22 | Stop reason: HOSPADM

## 2024-01-22 RX ORDER — OXYTOCIN 10 [USP'U]/ML
10 INJECTION, SOLUTION INTRAMUSCULAR; INTRAVENOUS
Status: CANCELLED | OUTPATIENT
Start: 2024-01-22

## 2024-01-22 RX ORDER — ONDANSETRON 2 MG/ML
4 INJECTION INTRAMUSCULAR; INTRAVENOUS EVERY 30 MIN PRN
Status: DISCONTINUED | OUTPATIENT
Start: 2024-01-22 | End: 2024-01-26 | Stop reason: HOSPADM

## 2024-01-22 RX ORDER — DEXTROSE, SODIUM CHLORIDE, SODIUM LACTATE, POTASSIUM CHLORIDE, AND CALCIUM CHLORIDE 5; .6; .31; .03; .02 G/100ML; G/100ML; G/100ML; G/100ML; G/100ML
INJECTION, SOLUTION INTRAVENOUS CONTINUOUS
Status: DISCONTINUED | OUTPATIENT
Start: 2024-01-22 | End: 2024-01-26 | Stop reason: HOSPADM

## 2024-01-22 RX ORDER — AMOXICILLIN 250 MG
2 CAPSULE ORAL 2 TIMES DAILY
Status: DISCONTINUED | OUTPATIENT
Start: 2024-01-22 | End: 2024-01-26 | Stop reason: HOSPADM

## 2024-01-22 RX ORDER — METOCLOPRAMIDE HYDROCHLORIDE 5 MG/ML
10 INJECTION INTRAMUSCULAR; INTRAVENOUS EVERY 6 HOURS PRN
Status: DISCONTINUED | OUTPATIENT
Start: 2024-01-22 | End: 2024-01-22 | Stop reason: HOSPADM

## 2024-01-22 RX ORDER — OXYCODONE HYDROCHLORIDE 5 MG/1
5 TABLET ORAL EVERY 4 HOURS PRN
Status: DISCONTINUED | OUTPATIENT
Start: 2024-01-22 | End: 2024-01-26 | Stop reason: HOSPADM

## 2024-01-22 RX ORDER — FENTANYL CITRATE 50 UG/ML
50 INJECTION, SOLUTION INTRAMUSCULAR; INTRAVENOUS EVERY 30 MIN PRN
Status: DISCONTINUED | OUTPATIENT
Start: 2024-01-22 | End: 2024-01-22

## 2024-01-22 RX ORDER — NALOXONE HYDROCHLORIDE 0.4 MG/ML
0.2 INJECTION, SOLUTION INTRAMUSCULAR; INTRAVENOUS; SUBCUTANEOUS
Status: DISCONTINUED | OUTPATIENT
Start: 2024-01-22 | End: 2024-01-26 | Stop reason: HOSPADM

## 2024-01-22 RX ORDER — PROCHLORPERAZINE 25 MG
25 SUPPOSITORY, RECTAL RECTAL EVERY 12 HOURS PRN
Status: DISCONTINUED | OUTPATIENT
Start: 2024-01-22 | End: 2024-01-26 | Stop reason: HOSPADM

## 2024-01-22 RX ORDER — LOPERAMIDE HCL 2 MG
4 CAPSULE ORAL
Status: DISCONTINUED | OUTPATIENT
Start: 2024-01-22 | End: 2024-01-22

## 2024-01-22 RX ORDER — OXYTOCIN/0.9 % SODIUM CHLORIDE 30/500 ML
100-340 PLASTIC BAG, INJECTION (ML) INTRAVENOUS CONTINUOUS PRN
Status: CANCELLED | OUTPATIENT
Start: 2024-01-22

## 2024-01-22 RX ORDER — BISACODYL 10 MG
10 SUPPOSITORY, RECTAL RECTAL DAILY PRN
Status: DISCONTINUED | OUTPATIENT
Start: 2024-01-24 | End: 2024-01-26 | Stop reason: HOSPADM

## 2024-01-22 RX ORDER — AMOXICILLIN 250 MG
1 CAPSULE ORAL 2 TIMES DAILY
Status: DISCONTINUED | OUTPATIENT
Start: 2024-01-22 | End: 2024-01-26 | Stop reason: HOSPADM

## 2024-01-22 RX ORDER — PROCHLORPERAZINE MALEATE 10 MG
10 TABLET ORAL EVERY 6 HOURS PRN
Status: DISCONTINUED | OUTPATIENT
Start: 2024-01-22 | End: 2024-01-26 | Stop reason: HOSPADM

## 2024-01-22 RX ORDER — METOCLOPRAMIDE HYDROCHLORIDE 5 MG/ML
10 INJECTION INTRAMUSCULAR; INTRAVENOUS EVERY 6 HOURS PRN
Status: DISCONTINUED | OUTPATIENT
Start: 2024-01-22 | End: 2024-01-26 | Stop reason: HOSPADM

## 2024-01-22 RX ORDER — HYDROCORTISONE 25 MG/G
CREAM TOPICAL 3 TIMES DAILY PRN
Status: DISCONTINUED | OUTPATIENT
Start: 2024-01-22 | End: 2024-01-26 | Stop reason: HOSPADM

## 2024-01-22 RX ORDER — MISOPROSTOL 100 UG/1
25 TABLET ORAL
Status: DISCONTINUED | OUTPATIENT
Start: 2024-01-22 | End: 2024-01-22 | Stop reason: HOSPADM

## 2024-01-22 RX ORDER — PROCHLORPERAZINE 25 MG
25 SUPPOSITORY, RECTAL RECTAL EVERY 12 HOURS PRN
Status: DISCONTINUED | OUTPATIENT
Start: 2024-01-22 | End: 2024-01-22

## 2024-01-22 RX ORDER — FENTANYL/ROPIVACAINE/NS/PF 2MCG/ML-.1
PLASTIC BAG, INJECTION (ML) EPIDURAL
Status: DISCONTINUED | OUTPATIENT
Start: 2024-01-22 | End: 2024-01-22 | Stop reason: HOSPADM

## 2024-01-22 RX ORDER — CEFAZOLIN SODIUM/WATER 2 G/20 ML
2 SYRINGE (ML) INTRAVENOUS SEE ADMIN INSTRUCTIONS
Status: DISCONTINUED | OUTPATIENT
Start: 2024-01-22 | End: 2024-01-22 | Stop reason: HOSPADM

## 2024-01-22 RX ORDER — FENTANYL CITRATE 50 UG/ML
25 INJECTION, SOLUTION INTRAMUSCULAR; INTRAVENOUS EVERY 5 MIN PRN
Status: DISCONTINUED | OUTPATIENT
Start: 2024-01-22 | End: 2024-01-26 | Stop reason: HOSPADM

## 2024-01-22 RX ORDER — DEXTROSE MONOHYDRATE 25 G/50ML
25-50 INJECTION, SOLUTION INTRAVENOUS
Status: DISCONTINUED | OUTPATIENT
Start: 2024-01-22 | End: 2024-01-22 | Stop reason: HOSPADM

## 2024-01-22 RX ORDER — SIMETHICONE 80 MG
80 TABLET,CHEWABLE ORAL 4 TIMES DAILY PRN
Status: DISCONTINUED | OUTPATIENT
Start: 2024-01-22 | End: 2024-01-26 | Stop reason: HOSPADM

## 2024-01-22 RX ORDER — LOPERAMIDE HCL 2 MG
4 CAPSULE ORAL
Status: DISCONTINUED | OUTPATIENT
Start: 2024-01-22 | End: 2024-01-26 | Stop reason: HOSPADM

## 2024-01-22 RX ORDER — NALBUPHINE HYDROCHLORIDE 20 MG/ML
2.5-5 INJECTION, SOLUTION INTRAMUSCULAR; INTRAVENOUS; SUBCUTANEOUS EVERY 6 HOURS PRN
Status: DISCONTINUED | OUTPATIENT
Start: 2024-01-22 | End: 2024-01-26 | Stop reason: HOSPADM

## 2024-01-22 RX ORDER — ONDANSETRON 4 MG/1
4 TABLET, ORALLY DISINTEGRATING ORAL EVERY 6 HOURS PRN
Status: DISCONTINUED | OUTPATIENT
Start: 2024-01-22 | End: 2024-01-22

## 2024-01-22 RX ORDER — CEFAZOLIN SODIUM/WATER 2 G/20 ML
2 SYRINGE (ML) INTRAVENOUS
Status: COMPLETED | OUTPATIENT
Start: 2024-01-22 | End: 2024-01-22

## 2024-01-22 RX ORDER — IBUPROFEN 800 MG/1
800 TABLET, FILM COATED ORAL
Status: DISCONTINUED | OUTPATIENT
Start: 2024-01-22 | End: 2024-01-26 | Stop reason: HOSPADM

## 2024-01-22 RX ORDER — ONDANSETRON 2 MG/ML
4 INJECTION INTRAMUSCULAR; INTRAVENOUS EVERY 6 HOURS PRN
Status: DISCONTINUED | OUTPATIENT
Start: 2024-01-22 | End: 2024-01-22

## 2024-01-22 RX ORDER — PROCHLORPERAZINE MALEATE 10 MG
10 TABLET ORAL EVERY 6 HOURS PRN
Status: DISCONTINUED | OUTPATIENT
Start: 2024-01-22 | End: 2024-01-22

## 2024-01-22 RX ORDER — ACETAMINOPHEN 325 MG/1
975 TABLET ORAL ONCE
Status: COMPLETED | OUTPATIENT
Start: 2024-01-22 | End: 2024-01-22

## 2024-01-22 RX ORDER — ONDANSETRON 4 MG/1
4 TABLET, ORALLY DISINTEGRATING ORAL EVERY 6 HOURS PRN
Status: DISCONTINUED | OUTPATIENT
Start: 2024-01-22 | End: 2024-01-26 | Stop reason: HOSPADM

## 2024-01-22 RX ORDER — LIDOCAINE 40 MG/G
CREAM TOPICAL
Status: DISCONTINUED | OUTPATIENT
Start: 2024-01-22 | End: 2024-01-26 | Stop reason: HOSPADM

## 2024-01-22 RX ORDER — CARBOPROST TROMETHAMINE 250 UG/ML
250 INJECTION, SOLUTION INTRAMUSCULAR
Status: DISCONTINUED | OUTPATIENT
Start: 2024-01-22 | End: 2024-01-22

## 2024-01-22 RX ADMIN — Medication: at 20:19

## 2024-01-22 RX ADMIN — SODIUM CHLORIDE, POTASSIUM CHLORIDE, SODIUM LACTATE AND CALCIUM CHLORIDE: 600; 310; 30; 20 INJECTION, SOLUTION INTRAVENOUS at 22:07

## 2024-01-22 RX ADMIN — Medication 100 MCG: at 20:28

## 2024-01-22 RX ADMIN — Medication 300 ML/HR: at 21:52

## 2024-01-22 RX ADMIN — ONDANSETRON 4 MG: 2 INJECTION INTRAMUSCULAR; INTRAVENOUS at 21:33

## 2024-01-22 RX ADMIN — ACETAMINOPHEN 975 MG: 325 TABLET ORAL at 21:13

## 2024-01-22 RX ADMIN — Medication 25 MCG: at 09:23

## 2024-01-22 RX ADMIN — Medication 2 MG: at 21:54

## 2024-01-22 RX ADMIN — LIDOCAINE HYDROCHLORIDE,EPINEPHRINE BITARTRATE 5 ML: 20; .005 INJECTION, SOLUTION EPIDURAL; INFILTRATION; INTRACAUDAL; PERINEURAL at 21:37

## 2024-01-22 RX ADMIN — FENTANYL CITRATE 50 MCG: 50 INJECTION, SOLUTION INTRAMUSCULAR; INTRAVENOUS at 19:40

## 2024-01-22 RX ADMIN — LIDOCAINE HYDROCHLORIDE 2 ML: 20 INJECTION, SOLUTION INFILTRATION; PERINEURAL at 20:20

## 2024-01-22 RX ADMIN — SODIUM CHLORIDE, POTASSIUM CHLORIDE, SODIUM LACTATE AND CALCIUM CHLORIDE 500 ML: 600; 310; 30; 20 INJECTION, SOLUTION INTRAVENOUS at 19:58

## 2024-01-22 RX ADMIN — SODIUM CHLORIDE, POTASSIUM CHLORIDE, SODIUM LACTATE AND CALCIUM CHLORIDE 500 ML: 600; 310; 30; 20 INJECTION, SOLUTION INTRAVENOUS at 10:14

## 2024-01-22 RX ADMIN — PHENYLEPHRINE HYDROCHLORIDE 0.5 MCG/KG/MIN: 10 INJECTION INTRAVENOUS at 21:35

## 2024-01-22 RX ADMIN — Medication 25 MCG: at 14:25

## 2024-01-22 RX ADMIN — FENTANYL CITRATE 100 MCG: 50 INJECTION INTRAMUSCULAR; INTRAVENOUS at 21:34

## 2024-01-22 RX ADMIN — AZITHROMYCIN MONOHYDRATE 500 MG: 500 INJECTION, POWDER, LYOPHILIZED, FOR SOLUTION INTRAVENOUS at 21:38

## 2024-01-22 RX ADMIN — KETOROLAC TROMETHAMINE 15 MG: 30 INJECTION, SOLUTION INTRAMUSCULAR; INTRAVENOUS at 22:06

## 2024-01-22 RX ADMIN — Medication 2 G: at 21:32

## 2024-01-22 RX ADMIN — SODIUM CITRATE AND CITRIC ACID MONOHYDRATE 30 ML: 500; 334 SOLUTION ORAL at 21:14

## 2024-01-22 RX ADMIN — LIDOCAINE HYDROCHLORIDE,EPINEPHRINE BITARTRATE 5 ML: 20; .005 INJECTION, SOLUTION EPIDURAL; INFILTRATION; INTRACAUDAL; PERINEURAL at 21:34

## 2024-01-22 RX ADMIN — Medication 100 MCG: at 20:58

## 2024-01-22 RX ADMIN — Medication 25 MCG: at 16:25

## 2024-01-22 RX ADMIN — LIDOCAINE HYDROCHLORIDE 3 ML: 20 INJECTION, SOLUTION INFILTRATION; PERINEURAL at 20:19

## 2024-01-22 RX ADMIN — Medication 25 MCG: at 11:28

## 2024-01-22 RX ADMIN — SODIUM CHLORIDE, POTASSIUM CHLORIDE, SODIUM LACTATE AND CALCIUM CHLORIDE: 600; 310; 30; 20 INJECTION, SOLUTION INTRAVENOUS at 21:30

## 2024-01-22 ASSESSMENT — ACTIVITIES OF DAILY LIVING (ADL)
ADLS_ACUITY_SCORE: 20

## 2024-01-22 NOTE — LETTER
January 26, 2024      Rachel Machado  [address]  [place] [facility] [address]        To Whom It May Concern:    Please excuse Rachel Machado from work up to 8 weeks post partum due to surgery.   Please call if there are any questions.         Sincerely,      Rissa Sandoval MD  [facility]  [phone]

## 2024-01-22 NOTE — H&P
"OB HISTORY AND PHYSICAL UPDATE ADMISSION EXAM    Rachel Machado  1980  [id number]    HPI: 42yo  @ 39 3/7wks.  GBS neg.  Here for IOL.  PNC with [place],  Was on insulin for GDM, but was able to discontinue after only a few weeks with diet/exercise.     FOB had an MI- is still recovering. Hopes to be able to attend.      Estimated Date of Delivery: 2024                       EGA 39w3d    OB History    Para Term  AB Living   1 0 0 0 0 0   SAB IAB Ectopic Multiple Live Births   0 0 0 0 0      # Outcome Date GA Lbr Carroll/2nd Weight Sex Delivery Anes PTL Lv   1 Current              Past Medical History:   Diagnosis Date    Encounter for elective induction of labor 2024    Gestational diabetes mellitus 10/26/2023    Mild intermittent asthma          Prenatal Complications  AMA and Diabetes    Exam:    /68   Ht 1.575 m (5' 2\")   Wt 76.6 kg (168 lb 12.8 oz)   LMP 2023   BMI 30.87 kg/m          HEENT          WNL              Heart              WNL               Lungs             WNL                      Abdomen        WNL                       Extremities     WNL                     Vaginal exam 0/70/-2      Fetal Status  Cat 1      BSUS cephalic presentation    Assessment:  AMA.  GDMA2- stable now off meds.  39wks.  Unfavorable cervix.      Plan: Induction of labor, anticipate vaginal delivery.  Cervical ripening until able to place Cook/Stewart.   GBS neg.  FOB in hospital - still recovering from MI- would like to time for him to be able to attend- he can be release for approx 4 hours.       Gloria Tim DO, FACOG  2024 9:12 AM     "

## 2024-01-22 NOTE — PROGRESS NOTES
"[facility] Labor and Delivery Progress Note    Rachel Machado MRN# [id number]   Age: 43 year old YOB: 1980           Subjective:   Family came out to hallway with concern of change of FHR to 70s.  At first impression, thought maternal but then audible decel to lyle of 50s present.  Patient just admitted for induction.           Objective:   Patient Vitals for the past 24 hrs:   BP Height Weight BMI (Calculated)   24 0838 -- 1.575 m (5' 2\") 76.6 kg (168 lb 12.8 oz) 30.87   24 0800 110/68 -- -- --         Cervical Exam: 0 / 70% / -1          Membranes: Intact     Fetal Heart Rate:    Monitor: external US    Variability: moderate (amplitude range 6 to 25 bpm)    Baseline Rate: normal range    Fetal Heart Rate Tracing: moderate variability, accels present, one prolonged FHR deceleration with recovery with maternal side lying position          Assessment:   Rachel Machado is a 43 year old  who is 39w3d here with induction secondary to AMA, GDM, long history of infertility, FOB with recent MI and still recovering, concerning FHR deceleration remote from delivery.          Plan:   Admit - see IP orders, place IV, discussed if concern for fetal distress or recurrent prolonged FHR deceleration remote from delivery need for emergent or urgent .  Discussed scenarios of general vs regional anesthesia with risks of bleeding, infection, need for blood transfusion, risk of injury, etc.  All questions answered.  Update given to primary OB Dr. [place] Fuentes MD      "

## 2024-01-22 NOTE — PROGRESS NOTES
Dr Tim updated on patient status. Contraction q3-4 minutes, continued moderate variability and accels present. No decels noted for 45 minutes, and none observed currently. Plan of care discussed per MD. To continue cytotec as ordered, may saline lock IV, and continue to monitor FHM.

## 2024-01-22 NOTE — PROGRESS NOTES
Provider notification:   Provider: Dr Alfredo PALAFOX was notified at 0943 regarding:Category II fetal heart rate tracing for 5 minutes.  Category II Algorithm     Fetal heart rate and uterine activity reviewed with provider.    EFM interpretation suggests: absence of concern for metabolic acidemia due to: presence of accelerations and moderate variability. EFM suggests no concern for interruption of the oxygen pathway...     First dose of cytotec given 0923, prolonged decel of FHR noted at 0942 with decrease of FHR to 60 bpm for 5 minutes, with return to FHR baseline of 140 bmp. Dr Fuentes in room to evaluate. Patient repositioned to left side. Attempting IV site for IV bolus. PICC team called. No further decels noted at this time. Will continue to monitor tracing.

## 2024-01-22 NOTE — PROGRESS NOTES
Was called by patients nurse to perform ultrasound to confirm fetal position.   I performed bedside ultrasound. The fetus is in vertex position.     Myles Dumas MD

## 2024-01-22 NOTE — PROGRESS NOTES
Data: Patient admitted to room 16 at 0730. Patient is a . Prenatal record reviewed.   OB History    Para Term  AB Living   1 0 0 0 0 0   SAB IAB Ectopic Multiple Live Births   0 0 0 0 0      # Outcome Date GA Lbr Carroll/2nd Weight Sex Delivery Anes PTL Lv   1 Current            .  Medical History:   Past Medical History:   Diagnosis Date    Encounter for elective induction of labor 2024    Gestational diabetes mellitus 10/26/2023    Mild intermittent asthma    .  Gestational age 39w3d. Vital signs per doc flowsheet. Fetal movement present. Patient reports Induction Of Labor   as reason for admission. Support persons Dionne (sister) present.  Action: Verbal consent for EFM, external fetal monitors applied. Admission assessment completed. Patient and support persons educated on labor process. Patient instructed to report change in fetal movement, contractions, vaginal leaking of fluid or bleeding, abdominal pain, or any concerns related to the pregnancy to her nurse/physician. Patient oriented to room, call light in reach.   Response: Dr. Tim informed of patient arrival. Plan per provider is start cervical ripening with oral Cytotec. Patient verbalized understanding of education and verbalized agreement with plan.

## 2024-01-22 NOTE — PROGRESS NOTES
(Writer is preceptor for Anni CARMONA RN.) Dr. Tim present on unit, updated on pt status. Next dose of cytotec given, previously held due to ctx pattern q 2-3min. Current category 1 FHR tracing with moderate variability and accels present. No further decels noted. Will continue current plan of care.

## 2024-01-23 LAB
GLUCOSE BLDC GLUCOMTR-MCNC: 140 MG/DL (ref 70–99)
GLUCOSE BLDC GLUCOMTR-MCNC: 81 MG/DL (ref 70–99)
HGB BLD-MCNC: 9.8 G/DL (ref 11.7–15.7)

## 2024-01-23 PROCEDURE — 250N000011 HC RX IP 250 OP 636: Performed by: OBSTETRICS & GYNECOLOGY

## 2024-01-23 PROCEDURE — 120N000001 HC R&B MED SURG/OB

## 2024-01-23 PROCEDURE — 250N000013 HC RX MED GY IP 250 OP 250 PS 637: Performed by: OBSTETRICS & GYNECOLOGY

## 2024-01-23 PROCEDURE — 85018 HEMOGLOBIN: CPT | Performed by: OBSTETRICS & GYNECOLOGY

## 2024-01-23 PROCEDURE — 36415 COLL VENOUS BLD VENIPUNCTURE: CPT | Performed by: OBSTETRICS & GYNECOLOGY

## 2024-01-23 RX ORDER — DEXTROSE MONOHYDRATE 25 G/50ML
25-50 INJECTION, SOLUTION INTRAVENOUS
Status: DISCONTINUED | OUTPATIENT
Start: 2024-01-23 | End: 2024-01-26 | Stop reason: HOSPADM

## 2024-01-23 RX ORDER — MORPHINE SULFATE 1 MG/ML
INJECTION, SOLUTION EPIDURAL; INTRATHECAL; INTRAVENOUS PRN
Status: DISCONTINUED | OUTPATIENT
Start: 2024-01-22 | End: 2024-01-23

## 2024-01-23 RX ORDER — NICOTINE POLACRILEX 4 MG
15-30 LOZENGE BUCCAL
Status: DISCONTINUED | OUTPATIENT
Start: 2024-01-23 | End: 2024-01-26 | Stop reason: HOSPADM

## 2024-01-23 RX ADMIN — KETOROLAC TROMETHAMINE 30 MG: 30 INJECTION, SOLUTION INTRAMUSCULAR; INTRAVENOUS at 10:23

## 2024-01-23 RX ADMIN — KETOROLAC TROMETHAMINE 30 MG: 30 INJECTION, SOLUTION INTRAMUSCULAR; INTRAVENOUS at 04:52

## 2024-01-23 RX ADMIN — ACETAMINOPHEN 975 MG: 325 TABLET ORAL at 03:24

## 2024-01-23 RX ADMIN — ACETAMINOPHEN 975 MG: 325 TABLET ORAL at 22:19

## 2024-01-23 RX ADMIN — SIMETHICONE 80 MG: 80 TABLET, CHEWABLE ORAL at 20:39

## 2024-01-23 RX ADMIN — SENNOSIDES AND DOCUSATE SODIUM 1 TABLET: 8.6; 5 TABLET ORAL at 08:35

## 2024-01-23 RX ADMIN — SENNOSIDES AND DOCUSATE SODIUM 2 TABLET: 8.6; 5 TABLET ORAL at 22:19

## 2024-01-23 RX ADMIN — ACETAMINOPHEN 975 MG: 325 TABLET ORAL at 08:35

## 2024-01-23 RX ADMIN — KETOROLAC TROMETHAMINE 30 MG: 30 INJECTION, SOLUTION INTRAMUSCULAR; INTRAVENOUS at 16:43

## 2024-01-23 RX ADMIN — LEVOTHYROXINE SODIUM 25 MCG: 0.03 TABLET ORAL at 06:45

## 2024-01-23 RX ADMIN — IBUPROFEN 800 MG: 800 TABLET ORAL at 23:28

## 2024-01-23 RX ADMIN — SENNOSIDES AND DOCUSATE SODIUM 1 TABLET: 8.6; 5 TABLET ORAL at 00:17

## 2024-01-23 RX ADMIN — ACETAMINOPHEN 975 MG: 325 TABLET ORAL at 15:41

## 2024-01-23 ASSESSMENT — ACTIVITIES OF DAILY LIVING (ADL)
ADLS_ACUITY_SCORE: 20
ADLS_ACUITY_SCORE: 23
ADLS_ACUITY_SCORE: 20
ADLS_ACUITY_SCORE: 23
ADLS_ACUITY_SCORE: 23
ADLS_ACUITY_SCORE: 20

## 2024-01-23 NOTE — ANESTHESIA CARE TRANSFER NOTE
Patient: Rachel Machado    Procedure: Procedure(s):   SECTION       Diagnosis: Fetal distress affecting delivery [O77.9]  Diagnosis Additional Information: No value filed.    Anesthesia Type:   No value filed.     Note:    Oropharynx: oropharynx clear of all foreign objects  Level of Consciousness: awake  Oxygen Supplementation: room air    Independent Airway: airway patency satisfactory and stable  Dentition: dentition unchanged  Vital Signs Stable: post-procedure vital signs reviewed and stable  Report to RN Given: handoff report given  Patient transferred to: Labor and Delivery    Handoff Report: Identifed the Patient, Identified the Reponsible Provider, Reviewed the pertinent medical history, Discussed the surgical course, Reviewed Intra-OP anesthesia mangement and issues during anesthesia, Set expectations for post-procedure period and Allowed opportunity for questions and acknowledgement of understanding      Vitals:  Vitals Value Taken Time   /57 24 2231   Temp 98    Pulse 69    Resp 14    SpO2 97 % 24 2230   Vitals shown include unfiled device data.    Electronically Signed By: LILLIAN Bassett CRNA  2024  10:34 PM

## 2024-01-23 NOTE — PLAN OF CARE
"Goal Outcome Evaluation:     Patient reports good pain control with scheduled toradol and tylenol.  RN gave patient abdominal binder to assist with pain control, and patient reported \"feeling better\" after use of abdominal binder.  "

## 2024-01-23 NOTE — PROGRESS NOTES
2010  Patient requesting for labor epidural    2014  Dr. Egan at bedside for epidural placement    2019  Patient placed on right side.    2024  Decels noted. Patient repositioned. IV bolus given. Phenylephrine given. Dr. Fuentes called to bedside.     2035  C-section called.

## 2024-01-23 NOTE — PROGRESS NOTES
Patient requesting nitrous for pain relief. Dr Fuentes present on unit and notified. Orders received. Nitrous started at 1905.     1915 report given to DEANGELO Soler RN.

## 2024-01-23 NOTE — LACTATION NOTE
Reason for Initial Lactation Visit: Lactation consultant to patient room to assess breastfeeding.     Breastfeeding goals: breastfeed     Past breastfeeding experience: no     Maternal health risks that may affect breastfeeding: AMA, infertility, GDM    Pump for home use: yes- friends gave her a spectra and willow- have not used insurance benefit discussed renting HGP due to risk factors for LMS.       Breastfeeding since birth?: yes    Breast assessment: round, soft, nipple intact     Education:Instructed on benefit of skin to skin prior to feeding to waken and ready for feeding, importance of feeding baby on early hunger cues, and breastfeeding 8-12 times, both breasts, in 24 hours for adequate infant nutrition and hydration as well as for building an optimal milk supply. Education given on importance of optimal infant positioning for deep, comfortable latch and effective milk transfer. Instructed on techniques for keeping infant actively sucking, including breast compressions. Anticipatory guidance given on input/output goals, normal feeding volumes in the  period, and pumping.     Assessment/Intervention: Infant had been on right breast before I came into room.   Assisted with latching on left breast,mom was able to latch infant easily onto left breast, infant able to latch and maintain with rhythmic sucking pattern, swallows noted about every 5-8 sucks.       Feeding Plan:  offer breast with a goal of 15 min per side,     Supplementing/Method:  after each feeding offer 10 ml of DM- due to infants weight, not being content after feedings,  and mom's risk factors for LMS    Paced Bottlefeeding Instruction:  yes     Pumping Plan:  pump after each day feeding, over night pump only if infant does not go to breast.      Other interventions/instructions:    Reviewed online and outpatient lactation resources for breastfeeding help after discharge. Answered questions and gave encouragement.

## 2024-01-23 NOTE — ANESTHESIA PROCEDURE NOTES
"Epidural catheter Procedure Note    Pre-Procedure   Staff -        Anesthesiologist:  Maite Egan MD       Performed By: anesthesiologist       Location: OB       Procedure Start/Stop Times: 1/22/2024 8:10 PM and 1/22/2024 8:26 PM       Pre-Anesthestic Checklist: patient identified, IV checked, risks and benefits discussed, informed consent, monitors and equipment checked, pre-op evaluation, at physician/surgeon's request and post-op pain management  Timeout:       Correct Patient: Yes        Correct Procedure: Yes        Correct Site: Yes        Correct Position: Yes   Procedure Documentation  Procedure: epidural catheter       Patient Position: sitting       Patient Prep/Sterile Barriers: sterile gloves, mask       Skin prep: Chloraprep       Local skin infiltrated with 3 mL of 1% lidocaine.        Insertion Site: L2-3. (midline approach).       Technique: LORT saline        RON at 6 cm.       Needle type: [employer].       Needle Gauge: 18.        Needle Length (Inches): 3.5        Catheter: 20 G.          Catheter threaded easily.         5.5 cm epidural space.         Threaded 11.5 cm at skin.         # of attempts: 1 and  # of redirects:  0    Assessment/Narrative         Paresthesias: No.       Test dose of 3 mL at.         Test dose negative, 3 minutes after injection, for signs of intravascular, subdural, or intrathecal injection.       Insertion/Infusion Method: LORT saline       No aspiration negative for Heme or CSF via Epidural Catheter.    Medication(s) Administered   Medication Administration Time: 1/22/2024 8:10 PM      FOR [facility] ONLY:   Pain Team Contact information: please page the Pain Team Via [employer]. Search \"Pain\". During daytime hours, please page the attending first. At night please page the resident first.      "

## 2024-01-23 NOTE — PROGRESS NOTES
Patient requesting for stronger medication.  SVE 1/80/-2    Nitrous stopped. Fentanyl 50mcg given x1.

## 2024-01-23 NOTE — ANESTHESIA PREPROCEDURE EVALUATION
Anesthesia Pre-Procedure Evaluation    Patient: Rachel Machado   MRN: [id number] : 1980        Procedure : * No procedures listed *          Past Medical History:   Diagnosis Date    Encounter for elective induction of labor 2024    Gestational diabetes mellitus 10/26/2023    Mild intermittent asthma       Past Surgical History:   Procedure Laterality Date    LAPAROSCOPIC SALPINGECTOMY Left 10/14/2021    Procedure: LAPAROSCOPIC LEFT SALPINGECTOMY, HYSTEROSCOPY DILATION AND CURETTAGE;  Surgeon: Gloria Tim MD;  Location: [facility]    NO HISTORY OF SURGERY      WISDOM TOOTH EXTRACTION        No Known Allergies   Social History     Tobacco Use    Smoking status: Never     Passive exposure: Never    Smokeless tobacco: Never   Substance Use Topics    Alcohol use: Yes     Comment: socially      Wt Readings from Last 1 Encounters:   24 76.6 kg (168 lb 12.8 oz)        Anesthesia Evaluation   Pt has not had prior anesthetic         ROS/MED HX  ENT/Pulmonary:     (+)                      asthma                  Neurologic:       Cardiovascular:    (-) PIH   METS/Exercise Tolerance:     Hematologic:     (+)    no thrombocytopenia,         (-) anemia   Musculoskeletal:   (+)       kyphoscoliosis lower back pain       GI/Hepatic:       Renal/Genitourinary:       Endo:     (+)  type II DM,             Obesity,       Psychiatric/Substance Use:       Infectious Disease:       Malignancy:       Other:     (-) previous  and TOLAC candidate       Physical Exam    Airway        Mallampati: II   TM distance: > 3 FB   Neck ROM: full     Respiratory Devices and Support         Dental  no notable dental history         Cardiovascular   cardiovascular exam normal          Pulmonary   pulmonary exam normal                OUTSIDE LABS:  CBC:   Lab Results   Component Value Date    WBC 7.3 2023    HGB 11.5 (L) 2024    HGB 12.2 2023    HCT 38.4 2023      "2023     BMP:   Lab Results   Component Value Date    GLC 67 (L) 2024     COAGS: No results found for: \"PTT\", \"INR\", \"FIBR\"  POC:   Lab Results   Component Value Date    HCG Negative 10/14/2021     HEPATIC: No results found for: \"ALBUMIN\", \"PROTTOTAL\", \"ALT\", \"AST\", \"GGT\", \"ALKPHOS\", \"BILITOTAL\", \"BILIDIRECT\", \"NADIYA\"  OTHER:   Lab Results   Component Value Date    A1C 4.2 2024    TSH 0.71 2023    T4 1.26 2023       Anesthesia Plan    ASA Status:  2, emergent       Anesthesia Type: Epidural.              Consents    Anesthesia Plan(s) and associated risks, benefits, and realistic alternatives discussed. Questions answered and patient/representative(s) expressed understanding.     - Discussed:     - Discussed with:  Patient            Postoperative Care            Comments:    Other Comments:  due to fetal intolerance of labor. Epidural working well will plan to dose up and use for case. Duramorph after delivery. Shakeel/Effie Egan MD    I have reviewed the pertinent notes and labs in the chart from the past 30 days and (re)examined the patient.  Any updates or changes from those notes are reflected in this note.             # Drug Induced Platelet Defect: home medication list includes an antiplatelet medication      "

## 2024-01-23 NOTE — PLAN OF CARE
Problem: Labor  Goal: Hemostasis  Outcome: Adequate for Care Transition  Goal: Stable Fetal Wellbeing  Outcome: Adequate for Care Transition  Goal: Effective Progression to Delivery  Outcome: Adequate for Care Transition  Goal: Absence of Infection Signs and Symptoms  Outcome: Adequate for Care Transition  Goal: Acceptable Pain Control  Outcome: Adequate for Care Transition  Goal: Normal Uterine Contraction Pattern  Outcome: Adequate for Care Transition     Problem: Postpartum ( Delivery)  Goal: Successful Parent Role Transition  Outcome: Progressing  Goal: Anesthesia/Sedation Recovery  Outcome: Met  Goal: Optimal Pain Control and Function  Outcome: Progressing  Intervention: Prevent or Manage Pain  Recent Flowsheet Documentation  Taken 2024 0540 by Karlene Schaeffer RN  Perineal Care:   perineal hygiene encouraged   perineal spray bottle/warm water use encouraged   perineum cleansed  Goal: Nausea and Vomiting Relief  Outcome: Met  Goal: Effective Oxygenation and Ventilation  Outcome: Progressing    Per patient pain is well tolerated with current Tylenol and Toradol.  Patient encouraged to call for nurse if pain increases.      Patient assisted to bathroom and tolerated.  Denied dizziness or lightheaded. Stewart catheter removed at 0540.  Patient encouraged to call nurse with next bathroom use.   Patient verbalized understanding.

## 2024-01-23 NOTE — PLAN OF CARE
"Goal Outcome Evaluation: Progressing      Plan of Care Reviewed With: patient    Overall Patient Progress: improvingOverall Patient Progress: improving    Patient's VSS. Voiding spontaneously and adequately. Fundus is firm 1 cm below umbilicus with scant lochia. Abdomen upon palpation is firm/rigid, bowel sounds normoactive; patient reports mild generalized abdominal tenderness and incisional pain being treated with acetaminophen, toradol, abdominal binder, simethicone, and rest.Patient is up ad davie and performing self-cares with minimal assistance. Patient's family has been in room visiting with patient and infant this shift, attentive/supportive and participating in cares.    Birth registrar went to patient room to answer questions regarding the ROP and situation with her significant other; all questions answered at that time, patient knows to call out with any further questions regarding the ROP, birth ceritficate, or PPMA.     /68 (BP Location: Right arm, Patient Position: Semi-Magallanes's, Cuff Size: Adult Regular)   Pulse 71   Temp 98.1  F (36.7  C) (Oral)   Resp 18   Ht 1.575 m (5' 2\")   Wt 76.6 kg (168 lb 12.8 oz)   LMP 04/13/2023   SpO2 100%   Breastfeeding Unknown   BMI 30.87 kg/m      JAYSON SANTILLAN RN on 1/23/2024 at 11:02 PM      Problem: Adult Inpatient Plan of Care  Goal: Optimal Comfort and Wellbeing  Outcome: Progressing         "

## 2024-01-23 NOTE — OP NOTE
NAME:  Rachel Machado     RECORD # [id number]     [facility] # [id number]    DATE OF SERVICE: 2024     PREOPERATIVE DIAGNOSIS: non-reassuring fetal heart rate tracing remote from delivery    POSTOPERATIVE DIAGNOSIS: same    PROCEDURE: Low transverse  section      SURGEON:  Chelo Fuentes MD     ASSISTANT: surgical tech    ANESTHESIA: epidural    FINDINGS: Normal uterus, normal right tube, absent left tube and normal ovaries bilateral.  Live female infant born with Apgars of 8 at one minute, 9 at 5 minutes,  weight 5 pounds 6 ounces and 3rd percentile, moderate meconium stained fluid.  Position cephalic.    SPECIMENS: cord blood    Delivery QBL (mL): 388      DRAINS: Stewart catheter.    COMPLICATIONS: none apparent    DISPOSITION: Stable to recovery.    INDICATION AND CONSENT: The risks of the procedure were discussed with the patient including but not exclusive to bleeding and a risk of transfusion, infection, and damage to nearby organs, including the bladder, ureter and bowel.    PROCEDURE:  Patient was seen for fetal bradycardia after epidural placement with recovery to baseline.  This was second prolonged induction with lyle to 40s/50s during induction.  Patient had additional social issue of father of baby inpatient cardiology and only able to attend to patient for up to 12 hours on pass.  Decision was made after discussing risks and benefits to proceed with  section. After obtaining informed consent, the patient was taken to the operating room in stable condition.  After induction of a epidural, fetal heart tones were checked and were stable.  She was prepped and draped in the usual sterile fashion and positioned in the dorsal supine position.   A timeout was then performed.      A pfannensteil skin incision was made to the level of the fascia.  The fascia was incised in the midline and extended laterally using mccabe scissors. Kocher clamps were applied to the superior aspect of the  fascial incision and the underlying rectus muscles were dissected using blunt and sharp dissection with the mccabe scissors.  The kocher clamps were then reapplied to the inferior  aspect of the fascial incision which was similarly sharply dissected from the rectus muscles.  The rectus muscles were  bluntly in the midline.  The peritoneum was identified and entered bluntly with the surgeons finger.  The peritoneal incision was extended laterally using manual traction.     The bladder was identified and was noted to be away from the intended hysterotomy.  The vesicouterine peritoneum was identified, grasped with pickups and incised with metzenbaum scissors, the bladder flap was created bluntly with the surgeon's finger.  A low transverse incision was created sharply with the scalpel and extended using cephalocaudal traction.  The fetal head was delivered.  The fetus was fully delivered and the cord was clamped and cut.  The  was handed off to the awaiting pediatric team after one minute of delayed cord clamping.  The placenta was manually and completely delivered intact with a 3 vessel cord.  The uterus was cleared of all clots and debris.  The uterine incision was closed with 0-vicryl in a running locked fashion.  0-vicryl was used to create a second imbricating layer.  The uterine incision was inspected and hemostatic.  The pericolic gutters were cleared of all clots and debris.  The uterine incision was again inspected and noted to be hemostatic. Surgicell powder placed.    The fascia was reapproximated in a running stitch of 0-vicryl.  The subcutaneous tissues were brought together with a running stitch of 3-0 plain.  The skin was closed with 4-0 monocryl in a subcuticular fashion.  The incision was dressed with steri strips.  Patient tolerated this procedure well.  Sponge, lap and needle counts were correct x two.  The patient was taken to the recovery room in stable condition.      Chelo Franco  MD Alfredo     CC: Gloria Tim, DO

## 2024-01-23 NOTE — PROGRESS NOTES
Csection - Post operative day 1    ASSESSMENT: PLAN:   POD#1      Primary csection for  fetal intolerance to labor remote from delivery    induction of labor for gestational hypertension  Gestational diabetes- on insulin- but diet controlled at the end of pregnancy   Advanced maternal age      Failed  induction of labor  Acute blood loss anemia- doing well- discussed s/s with patient- will monitor    Continue routine cares       SUBJECTIVE:    The patient feels well: Catheter is out, bleeding decreased, tolerating normal diet, and passing flatus.  Pain is well controlled. The patient has no emotional concerns.  The baby is well and being fed    OBJECTIVE:  Vitals:    01/23/24 0529 01/23/24 0530 01/23/24 0644 01/23/24 0700   BP: 111/54   103/57   BP Location:    Right arm   Pulse:    76   Resp: 18  18 18   Temp:    98.1  F (36.7  C)   TempSrc:    Oral   SpO2:  99% 98%    Weight:       Height:           Fundus firm  Incision- dressing dry   Ext- nontender      Lab  Hemoglobin   Date Value Ref Range Status   01/23/2024 9.8 (L) 11.7 - 15.7 g/dL Final   10/14/2021 13.1 g/dL Final     Comment:     lot [id number]  exp 4/20/22   ]        Rissa Sandoval MD  [facility]  [phone]

## 2024-01-23 NOTE — ANESTHESIA POSTPROCEDURE EVALUATION
Patient: Rachel Machado    Procedure: Procedure(s):   SECTION       Anesthesia Type:  No value filed.    Note:  Disposition: Inpatient   Postop Pain Control: Uneventful            Sign Out: Well controlled pain   PONV: No   Neuro/Psych: Uneventful            Sign Out: Acceptable/Baseline neuro status   Airway/Respiratory: Uneventful            Sign Out: Acceptable/Baseline resp. status   CV/Hemodynamics: Uneventful            Sign Out: Acceptable CV status; No obvious hypovolemia; No obvious fluid overload   Other NRE: NONE   DID A NON-ROUTINE EVENT OCCUR? No    Event details/Postop Comments:  Recovering uneventfully. Denies any headache, nausea, vomiting, numbness, tingling, or weakness.           Last vitals:  Vitals:    24 0810 24 1215 24 1621   BP: 103/57 96/53 107/71   Pulse:   69   Resp:  16 16   Temp:  36.8  C (98.2  F) 36.7  C (98.1  F)   SpO2: 97% 98% 98%       Electronically Signed By: Umair Retana MD  2024  5:34 PM

## 2024-01-23 NOTE — PROGRESS NOTES
"[facility] Labor and Delivery Progress Note    Rachel Machado MRN# [id number]   Age: 43 year old YOB: 1980           Subjective:   Called to room due to prolonged FHR deceleration to 50s after epidural placement.  Patient on hands and knees.           Objective:   Patient Vitals for the past 24 hrs:   BP Temp Temp src Resp SpO2 Height Weight BMI (Calculated) Oximeter Heart Rate   24 1923 120/68 98.2  F (36.8  C) Oral 18 100 % -- -- -- 67 bpm   24 1535 107/65 97.8  F (36.6  C) Oral 18 -- -- -- -- 60 bpm   24 1130 115/73 98  F (36.7  C) Oral 18 -- -- -- -- 74 bpm   24 0838 -- -- -- -- -- 1.575 m (5' 2\") 76.6 kg (168 lb 12.8 oz) 30.87 --   24 0803 -- -- -- -- -- -- -- -- 75 bpm   24 0800 110/68 -- -- -- -- -- -- -- --         Cervical Exam: 1 / 80% / -2      Position: Posterior    Membranes: Intact     Fetal Heart Rate:    Monitor: external US    Variability: moderate (amplitude range 6 to 25 bpm)    Baseline Rate: normal range    Fetal Heart Rate Tracing: category 1 prior to deceleration, recovery to normal baseline with minimal variability initially          Assessment:   Rachel Machado is a 43 year old  who is 39w3d here with non-reassuring FHR tracing remote from delivery, this is second fetal bradycardia early in induction process/labor          Plan:   Patient agreeable to  section, discussed risk of surgery to include bleeding, infection, injury to internal organs including bowel, bladder, uterus, baby as well as expectations for recovery and implications for future pregnancy.  All questions answered.  Consent obtained.  OR prepared, anesthesia notified.      Chelo Fuentes MD      "

## 2024-01-24 PROCEDURE — 120N000001 HC R&B MED SURG/OB

## 2024-01-24 PROCEDURE — 250N000013 HC RX MED GY IP 250 OP 250 PS 637: Performed by: OBSTETRICS & GYNECOLOGY

## 2024-01-24 RX ADMIN — OXYCODONE HYDROCHLORIDE 5 MG: 5 TABLET ORAL at 22:39

## 2024-01-24 RX ADMIN — SENNOSIDES AND DOCUSATE SODIUM 2 TABLET: 8.6; 5 TABLET ORAL at 08:46

## 2024-01-24 RX ADMIN — Medication: at 16:36

## 2024-01-24 RX ADMIN — OXYCODONE HYDROCHLORIDE 5 MG: 5 TABLET ORAL at 10:18

## 2024-01-24 RX ADMIN — SIMETHICONE 80 MG: 80 TABLET, CHEWABLE ORAL at 20:01

## 2024-01-24 RX ADMIN — ACETAMINOPHEN 975 MG: 325 TABLET ORAL at 04:04

## 2024-01-24 RX ADMIN — SIMETHICONE 80 MG: 80 TABLET, CHEWABLE ORAL at 04:04

## 2024-01-24 RX ADMIN — IBUPROFEN 800 MG: 800 TABLET ORAL at 20:01

## 2024-01-24 RX ADMIN — ACETAMINOPHEN 975 MG: 325 TABLET ORAL at 16:36

## 2024-01-24 RX ADMIN — OXYCODONE HYDROCHLORIDE 5 MG: 5 TABLET ORAL at 04:04

## 2024-01-24 RX ADMIN — OXYCODONE HYDROCHLORIDE 5 MG: 5 TABLET ORAL at 16:38

## 2024-01-24 RX ADMIN — ACETAMINOPHEN 975 MG: 325 TABLET ORAL at 10:17

## 2024-01-24 RX ADMIN — SENNOSIDES AND DOCUSATE SODIUM 2 TABLET: 8.6; 5 TABLET ORAL at 21:18

## 2024-01-24 RX ADMIN — SIMETHICONE 80 MG: 80 TABLET, CHEWABLE ORAL at 12:54

## 2024-01-24 RX ADMIN — IBUPROFEN 800 MG: 800 TABLET ORAL at 12:54

## 2024-01-24 RX ADMIN — LEVOTHYROXINE SODIUM 25 MCG: 0.03 TABLET ORAL at 08:49

## 2024-01-24 RX ADMIN — DOXYLAMINE SUCCINATE 25 MG: 25 TABLET ORAL at 22:39

## 2024-01-24 RX ADMIN — IBUPROFEN 800 MG: 800 TABLET ORAL at 05:27

## 2024-01-24 RX ADMIN — ACETAMINOPHEN 975 MG: 325 TABLET ORAL at 22:39

## 2024-01-24 ASSESSMENT — ACTIVITIES OF DAILY LIVING (ADL)
ADLS_ACUITY_SCORE: 20
ADLS_ACUITY_SCORE: 23
ADLS_ACUITY_SCORE: 20
ADLS_ACUITY_SCORE: 23

## 2024-01-24 NOTE — PROGRESS NOTES
Csection - Post operative day 2    ASSESSMENT: PLAN:   POD#2    Primary csection for  fetal intolerance to labor  Gestational daibetes  Advanced maternal age  Acute blood loss anemia- doing well- discussed s/s with patient- will monitor    Continue routine cares   aniticipate discharge in am    SUBJECTIVE:    The patient feels well: Catheter is out, bleeding decreased, tolerating normal diet, and passing flatus.  Pain is well controlled. The patient has no emotional concerns.  The baby is well and being fed    OBJECTIVE:  Vitals:    01/23/24 1215 01/23/24 1621 01/23/24 2022 01/24/24 0030   BP: 96/53 107/71 110/68 114/74   BP Location: Left arm Left arm Right arm Right arm   Patient Position: Semi-Magallanes's Semi-Magallanes's Semi-Magallanes's Semi-Magallanes's   Cuff Size: Adult Regular Adult Regular Adult Regular Adult Regular   Pulse:  69 71 76   Resp: 16 16 18 18   Temp: 98.2  F (36.8  C) 98.1  F (36.7  C) 98.1  F (36.7  C) 98  F (36.7  C)   TempSrc: Oral Oral Oral Oral   SpO2: 98% 98% 100% 99%   Weight:       Height:                 Incision- dressing dry   Ext- nontender      Lab  Hemoglobin   Date Value Ref Range Status   01/23/2024 9.8 (L) 11.7 - 15.7 g/dL Final   10/14/2021 13.1 g/dL Final     Comment:     lot [id number]  exp 4/20/22   ]        Rissa Sandoval MD  [facility]  [phone]

## 2024-01-24 NOTE — LACTATION NOTE
Patient has office appointment tomorrow with Dr Ryland Potter  Reason for  Lactation Visit: Lactation consultant to patient room to assess breastfeeding.     Breastfeeding goals: breastfeed     Past breastfeeding experience:no     Maternal health risks that may affect breastfeeding: AMA, infertility, Gdm    Pump for home use:     Pump for home use: yes- friends gave her a spectra and willow- have not used insurance benefit discussed renting H[facility] due to risk factors for LMS     Breast assessment:round soft nipple intact     Education:Instructed on benefit of skin to skin prior to feeding to waken and ready for feeding, importance of feeding baby on early hunger cues, and breastfeeding 8-12 times, both breasts, in 24 hours for adequate infant nutrition and hydration as well as for building an optimal milk supply. Education given on importance of optimal infant positioning for deep, comfortable latch and effective milk transfer. Instructed on techniques for keeping infant actively sucking, including breast compressions. Anticipatory guidance given on input/output goals, normal feeding volumes in the  period, and pumping.     Assessment/Intervention: latched infant onto both breast,  infant able to latch but needed rousing to stay allert and actrive at breast, very few swallows noted     Feeding Plan: offer breast each feeding- if infant refuses to latch than end attempt at breast and try again next feeding.    Supplementing/Method: DM 15-30 ml     Paced Bottlefeeding Instruction:yes     Pumping Plan: pump fro any supplement during the day- either pump or breast in the middle of night.      Other interventions/instructions:Discussed the need supplementation after discharge, either formula or dm.     Reviewed online and outpatient lactation resources for breastfeeding help after discharge. Answered questions and gave encouragement.

## 2024-01-24 NOTE — PLAN OF CARE
Problem: Postpartum ( Delivery)  Goal: Hemostasis  Outcome: Adequate for Care Transition  Goal: Absence of Infection Signs and Symptoms  Outcome: Adequate for Care Transition  Goal: Optimal Pain Control and Function  Outcome: Adequate for Care Transition  Intervention: Prevent or Manage Pain  Recent Flowsheet Documentation  Taken 2024 by Margaret Gonzalez RN  Perineal Care:   absorbent brief/pad changed   perineum cleansed   perineal hygiene encouraged   perineal spray bottle/warm water use encouraged  Goal: Effective Urinary Elimination  Outcome: Adequate for Care Transition  Intervention: Monitor and Manage Urinary Retention  Recent Flowsheet Documentation  Taken 2024 by Margaret Gonzalez RN  Urinary Elimination Promotion: frequent voiding encouraged   Goal Outcome Evaluation:       VSS, pt c/o moderated pain and was given oxycodone prn along with schedule tylenol. Fundus is firm and bleeding is scant. Incision dressing is c/d/I. No s/s of infection. Pt is voiding without difficulty. Breast feeding has been challenging and pt and her sister are having difficulty coping. Brought infant to nursery for last hour of shift so pt can rest.

## 2024-01-24 NOTE — PLAN OF CARE
Goal Outcome Evaluation:      Patient reports good pain relief with scheduled ibuprofen, tylenol and PRN oxycodone.  RN encouraged patient to use abdominal binder for comfort and pain control, and patient reports increased comfort and decreased pain with use of the binder.

## 2024-01-25 PROCEDURE — 250N000013 HC RX MED GY IP 250 OP 250 PS 637: Performed by: OBSTETRICS & GYNECOLOGY

## 2024-01-25 PROCEDURE — 120N000001 HC R&B MED SURG/OB

## 2024-01-25 RX ADMIN — SIMETHICONE 80 MG: 80 TABLET, CHEWABLE ORAL at 16:32

## 2024-01-25 RX ADMIN — IBUPROFEN 800 MG: 800 TABLET ORAL at 14:20

## 2024-01-25 RX ADMIN — ACETAMINOPHEN 975 MG: 325 TABLET ORAL at 12:13

## 2024-01-25 RX ADMIN — ACETAMINOPHEN 975 MG: 325 TABLET ORAL at 04:25

## 2024-01-25 RX ADMIN — SENNOSIDES AND DOCUSATE SODIUM 2 TABLET: 8.6; 5 TABLET ORAL at 08:23

## 2024-01-25 RX ADMIN — ACETAMINOPHEN 975 MG: 325 TABLET ORAL at 20:21

## 2024-01-25 RX ADMIN — LEVOTHYROXINE SODIUM 25 MCG: 0.03 TABLET ORAL at 06:07

## 2024-01-25 RX ADMIN — OXYCODONE HYDROCHLORIDE 5 MG: 5 TABLET ORAL at 16:13

## 2024-01-25 RX ADMIN — OXYCODONE HYDROCHLORIDE 5 MG: 5 TABLET ORAL at 02:40

## 2024-01-25 RX ADMIN — IBUPROFEN 800 MG: 800 TABLET ORAL at 20:21

## 2024-01-25 RX ADMIN — IBUPROFEN 800 MG: 800 TABLET ORAL at 08:22

## 2024-01-25 RX ADMIN — IBUPROFEN 800 MG: 800 TABLET ORAL at 02:40

## 2024-01-25 RX ADMIN — DOXYLAMINE SUCCINATE 25 MG: 25 TABLET ORAL at 22:36

## 2024-01-25 RX ADMIN — SIMETHICONE 80 MG: 80 TABLET, CHEWABLE ORAL at 20:21

## 2024-01-25 RX ADMIN — OXYCODONE HYDROCHLORIDE 5 MG: 5 TABLET ORAL at 20:21

## 2024-01-25 RX ADMIN — SENNOSIDES AND DOCUSATE SODIUM 2 TABLET: 8.6; 5 TABLET ORAL at 20:21

## 2024-01-25 RX ADMIN — OXYCODONE HYDROCHLORIDE 5 MG: 5 TABLET ORAL at 12:14

## 2024-01-25 ASSESSMENT — ACTIVITIES OF DAILY LIVING (ADL)
ADLS_ACUITY_SCORE: 20

## 2024-01-25 NOTE — CARE PLAN
Pt took oxycodone x1 for this writer's shift.  Requested to take scheduled Tylenol (due 1800) w/ oxy @2013.   Postpartum assessment WNL and VSS.  Ambulated around unit w/ baby this evening and tolerated well.  Encouraged to ambulate to promote circulation and healing.   Pt is hopeful for discharge to home in AM.  Would like HGP @ discharge.    Mood assessment & birth certificate complete. Pt's plan is to have her part of ROP notarized [facility] and then fax it to the hospital where FOB is currently admitted and he will have his portion notarized there; birth registrar will coordinate the faxing.     Detail Level: Zone

## 2024-01-25 NOTE — PROGRESS NOTES
Csection - Post operative day 2    ASSESSMENT: PLAN:   POD#3    Primary csection due to  fetal intolerance to labor  [facility]  Acute blood loss anemia- doing well- discussed s/s with patient- will monitor    Continue routine cares   aniticipate discharge in am    SUBJECTIVE:    The patient feels well: Catheter is out, bleeding decreased, tolerating normal diet, and passing flatus.  Pain is well controlled. The patient has no emotional concerns.  The baby is well and being fed    OBJECTIVE:  Vitals:    01/24/24 0900 01/24/24 1712 01/25/24 0240 01/25/24 0800   BP: 100/57 115/67 111/62    BP Location: Right arm Right arm Right arm    Patient Position: Semi-Magallanes's Semi-Magallanes's Semi-Magallanes's    Cuff Size: Adult Regular Adult Regular Adult Regular    Pulse: 73 81 78    Resp: 16 18 18    Temp: 98  F (36.7  C) 98.2  F (36.8  C) 98  F (36.7  C) 98.3  F (36.8  C)   TempSrc: Oral Oral Oral Oral   SpO2: 98% 100% 99%    Weight:       Height:                 Incision- dressing dry   Ext- nontender      Lab  Hemoglobin   Date Value Ref Range Status   01/23/2024 9.8 (L) 11.7 - 15.7 g/dL Final   10/14/2021 13.1 g/dL Final     Comment:     lot [id number]  exp 4/20/22   ]        Rissa Sandoval MD  [facility]  [phone]

## 2024-01-25 NOTE — PLAN OF CARE
"Goal Outcome Evaluation: Progressing      Plan of Care Reviewed With: patient    Overall Patient Progress: improvingOverall Patient Progress: improving    Patient's VSS. Fundus is firm at umbilicus with scant lochia. Patient is up ad davie, ambulating frequently and performing self-cares independently. Reporting generalized abdominal and incisional pain this shift being treated with acetaminophen, ibuprofen, oxycodone, simethicone, heat pad, ice, abdominal binder, and rest. Patient's parents and sister have been visiting this shift; her mother is here with her for the night, supportive and participating in cares. Patient is aware of birth certificate, PPMA, and ROP needing to be filled out; has not completed them yet this evening.     /67 (BP Location: Right arm, Patient Position: Semi-Magallanes's, Cuff Size: Adult Regular)   Pulse 81   Temp 98.2  F (36.8  C) (Oral)   Resp 18   Ht 1.575 m (5' 2\")   Wt 76.6 kg (168 lb 12.8 oz)   LMP 04/13/2023   SpO2 100%   Breastfeeding Unknown   BMI 30.87 kg/m      JAYSON SANTILLAN RN on 1/24/2024 at 10:59 PM      Problem: Adult Inpatient Plan of Care  Goal: Optimal Comfort and Wellbeing  Outcome: Progressing         "

## 2024-01-25 NOTE — PROGRESS NOTES
[facility]irthplace RN Care Coordinator Note    Rachel Machado  [id number]  1980    Chart reviewed, discharge plan discussed with bedside RN, needs assessed. If stable, discharge planned tomorrow, Friday, 1/26. Patient requests home care visit, nurse visit planned for Saturday, 1/27, [facility] Intake updated by this writer, patient added to [facility] schedule. Follow-up post-delivery appointment to be scheduled by patient as instructed at [facility] clinic based on discharge disposition.    RN Care Coordinator will continue to follow and assist if needed with discharge plan.

## 2024-01-25 NOTE — PLAN OF CARE
Problem: Adult Inpatient Plan of Care  Goal: Plan of Care Review  Description: The Plan of Care Review/Shift note should be completed every shift.  The Outcome Evaluation is a brief statement about your assessment that the patient is improving, declining, or no change.  This information will be displayed automatically on your shift  note.  Outcome: Progressing   Goal Outcome Evaluation:     Patient is up ad davie. Her pain is well controlled using tylenol, ibuprofen and tylenol. She is caring for self independently and would like to discharge home tomorrow. Incision is covered and main source of pain rated at 5/10 at it's worst.    Breastfeeding and pumping is done at each feed. No colostrum collected yet. Using donor milk as bridge until milk comes in. Pt is interested in renting HGB[employer] to initiate milk supply at home. She will need an order and pump on day of discharge. She knows the cost is 88 dollars if insurance declines coverage but she has not used the insurance to get her pump yet.    Post op vitals and recovery are stable. PPMA score 3. Birth certificate and ROP at bedside.

## 2024-01-26 VITALS
BODY MASS INDEX: 30.47 KG/M2 | DIASTOLIC BLOOD PRESSURE: 58 MMHG | OXYGEN SATURATION: 97 % | RESPIRATION RATE: 18 BRPM | SYSTOLIC BLOOD PRESSURE: 112 MMHG | HEIGHT: 62 IN | TEMPERATURE: 98.2 F | HEART RATE: 76 BPM | WEIGHT: 165.56 LBS

## 2024-01-26 PROBLEM — O92.79 INSUFFICIENT LACTATION: Status: ACTIVE | Noted: 2024-01-26

## 2024-01-26 PROCEDURE — 250N000013 HC RX MED GY IP 250 OP 250 PS 637: Performed by: OBSTETRICS & GYNECOLOGY

## 2024-01-26 RX ORDER — IBUPROFEN 600 MG/1
600 TABLET, FILM COATED ORAL EVERY 6 HOURS PRN
Qty: 40 TABLET | Refills: 0 | Status: SHIPPED | OUTPATIENT
Start: 2024-01-26

## 2024-01-26 RX ORDER — OXYCODONE HYDROCHLORIDE 5 MG/1
5 TABLET ORAL EVERY 6 HOURS PRN
Qty: 12 TABLET | Refills: 0 | Status: SHIPPED | OUTPATIENT
Start: 2024-01-26 | End: 2024-01-29

## 2024-01-26 RX ORDER — ACETAMINOPHEN 500 MG
500-1000 TABLET ORAL EVERY 6 HOURS PRN
Qty: 60 TABLET | Refills: 0 | Status: SHIPPED | OUTPATIENT
Start: 2024-01-26

## 2024-01-26 RX ORDER — SENNA AND DOCUSATE SODIUM 50; 8.6 MG/1; MG/1
1 TABLET, FILM COATED ORAL AT BEDTIME
Qty: 60 TABLET | Refills: 0 | Status: SHIPPED | OUTPATIENT
Start: 2024-01-26

## 2024-01-26 RX ADMIN — LEVOTHYROXINE SODIUM 25 MCG: 0.03 TABLET ORAL at 06:48

## 2024-01-26 RX ADMIN — IBUPROFEN 800 MG: 800 TABLET ORAL at 03:17

## 2024-01-26 RX ADMIN — ACETAMINOPHEN 975 MG: 325 TABLET ORAL at 09:01

## 2024-01-26 RX ADMIN — OXYCODONE HYDROCHLORIDE 5 MG: 5 TABLET ORAL at 03:17

## 2024-01-26 RX ADMIN — SIMETHICONE 80 MG: 80 TABLET, CHEWABLE ORAL at 01:17

## 2024-01-26 RX ADMIN — ACETAMINOPHEN 975 MG: 325 TABLET ORAL at 03:16

## 2024-01-26 RX ADMIN — OXYCODONE HYDROCHLORIDE 5 MG: 5 TABLET ORAL at 09:01

## 2024-01-26 RX ADMIN — IBUPROFEN 800 MG: 800 TABLET ORAL at 09:01

## 2024-01-26 RX ADMIN — SENNOSIDES AND DOCUSATE SODIUM 2 TABLET: 8.6; 5 TABLET ORAL at 09:01

## 2024-01-26 ASSESSMENT — ACTIVITIES OF DAILY LIVING (ADL)
ADLS_ACUITY_SCORE: 20

## 2024-01-26 NOTE — PLAN OF CARE
Goal Outcome Evaluation:  VSS and afebrile this shift. Pain is managed with scheduled tylenol and ibuprofen as well as PRN oxycodone and simethicone for gas pain. Fundal and lochia assessments WNL. Breastfeeding infant every 2-3 hours, using breast pump afterwards. Will be renting a hospital grade breast pump at discharge. Ambulating independently and voiding without reported difficulty. Sister is at bedside and supportive.   Problem: Adult Inpatient Plan of Care  Goal: Plan of Care Review  Description: The Plan of Care Review/Shift note should be completed every shift.  The Outcome Evaluation is a brief statement about your assessment that the patient is improving, declining, or no change.  This information will be displayed automatically on your shift  note.  Outcome: Progressing  Flowsheets (Taken 2024 7925)  Plan of Care Reviewed With: patient  Overall Patient Progress: improving     Problem: Adult Inpatient Plan of Care  Goal: Optimal Comfort and Wellbeing  Outcome: Progressing     Problem: Postpartum ( Delivery)  Goal: Successful Parent Role Transition  Outcome: Progressing       Plan of Care Reviewed With: patient    Overall Patient Progress: improvingOverall Patient Progress: improving

## 2024-01-26 NOTE — PROGRESS NOTES
"S/B: Rachel is a  who delivered via unscheduled  section on 2023. Her pregnancy was complicated by AMA, GDMA2 now stable off meds. She had an operative OBL of 388mL and no operative complications.    A/R:   Blood pressure 112/58, pulse 76, temperature 98.2  F (36.8  C), temperature source Oral, resp. rate 18, height 1.575 m (5' 2\"), weight 75.1 kg (165 lb 9 oz), last menstrual period 2023, SpO2 97%, unknown if currently breastfeeding.     Rachel has been stable in her postpartum period. She is hemodynamically stable and her pain is managed with scheduled ibuprofen and Tylenol, and PRN oxycodone.   Her dressing is intact without new drainage. She is ambulating independently without dizziness and is completing self care independently. She denies visual disturbances and symptoms of preeclampsia. She reports that her mood is stable. She is breastfeeding and pumping independently with adequate infant latch. She plans to breastfeed and pump/supplement formula as needed until her milk supply comes in. Patient is supported by her sister and extended family while her partner is hospitalized at [facility].    Plan: Discharge to home. Home care referral placed & scheduled for tomorrow. Patient to schedule outpatient follow up. Hospital grade pump ordered and supplied to patient. Prescriptions filled and sent home with patient.     Yady Sterling RN 2:51 PM 24    "

## 2024-01-29 ENCOUNTER — PATIENT OUTREACH (OUTPATIENT)
Dept: CARE COORDINATION | Facility: CLINIC | Age: 44
End: 2024-01-29
Payer: COMMERCIAL

## 2024-01-29 NOTE — PROGRESS NOTES
[facility] Care Resource Center Contact  [facility]/Voicemail     Clinical Data: Post-Discharge Outreach     Outreach attempted x 2.  Left message on patient's voicemail, providing [facility]'s central phone number of [phone] ([phone]) for questions/concerns and/or to schedule an appt with an [facility] provider, if they do not have a PCP.      Plan:  [facility] will do no further outreaches at this time.     HERMELINDO Hightower  [phone]  [facility]     *Connected Care Resource Team does NOT follow patient ongoing. Referrals are identified based on internal discharge reports and the outreach is to ensure patient has an understanding of their discharge instructions.

## 2024-02-13 NOTE — DISCHARGE SUMMARY
[facility] Discharge Summary    Rachel Machado MRN# [id number]   Age: 43 year old YOB: 1980     Date of Admission:  2024  Date of Discharge::  2024  3:05 PM  Admitting Physician:  Gloria Tim MD  Discharge Physician:  Rissa Sandoval MD     Home clinic:           Admission Diagnoses:   Encounter for elective induction of labor [Z34.90]  Gestational diabetes on insulin  Advanced maternal age            Discharge Diagnosis:     Fetal distress affecting delivery [O77.9]  S/p csection  Acute blood loss anemia - clinically insignificant            Procedures:     Procedure(s): Primary low transverse  section,  induction of labor                    Medications Prior to Admission:     No medications prior to admission.             Discharge Medications:     Discharge Medication List as of 2024  2:00 PM        START taking these medications    Details   acetaminophen (TYLENOL) 500 MG tablet Take 1-2 tablets (500-1,000 mg) by mouth every 6 hours as needed, Disp-60 tablet, R-0, E-Prescribe      ibuprofen (ADVIL/MOTRIN) 600 MG tablet Take 1 tablet (600 mg) by mouth every 6 hours as needed, Disp-40 tablet, R-0, E-Prescribe      oxyCODONE (ROXICODONE) 5 MG tablet Take 1 tablet (5 mg) by mouth every 6 hours as needed for pain, Disp-12 tablet, R-0, E-Prescribe      SENNA-docusate sodium (SENNA S) 8.6-50 MG tablet Take 1 tablet by mouth at bedtime, Disp-60 tablet, R-0, E-Prescribe           CONTINUE these medications which have NOT CHANGED    Details   acetone urine (KETOSTIX) test strip Use to test urine once daily in the morning.Disp-50 strip, [id number]      ADVAIR DISKUS 100-50 MCG/DOSE IN MISC 1 inhalation BID, Disp-1, R-11, Fax      Alcohol Swabs PADS 1 each daily, Disp-100 each, R-1, E-Prescribe      aspirin 81 MG EC tablet Take 81 mg by mouth daily, Historical      blood glucose (NO BRAND SPECIFIED) test strip Use to test blood sugar four times  daily  To accompany: Blood Glucose Monitor Brands: per insurance., Disp-100 strip, R-6, E-Prescribe      blood glucose monitoring (NO BRAND SPECIFIED) meter device kit Use to test blood sugar four times daily  Preferred blood glucose meter OR supplies to accompany: Blood Glucose Monitor Brands: per insurance.Disp-1 kit, [id number]      Continuous Blood Gluc Sensor (DEXCOM G7 SENSOR) MISC 1 each every 10 days Change sensor every 10 days, Disp-3 each, R-5, E-Prescribe      fish oil-omega-3 fatty acids 500 MG capsule Take 1 capsule by mouth daily, Historical      Insulin Pen Needle (PEN NEEDLES) 32G X 4 MM MISC 1 each daily, Disp-100 each, R-1, E-Prescribe      levothyroxine (SYNTHROID/LEVOTHROID) 25 MCG tablet Take 25 mcg by mouth daily, Historical      Prenatal Vit-Fe Fumarate-FA (PRENATAL MULTIVITAMIN  PLUS IRON) 27-1 MG TABS Take 1 tablet by mouth daily, Historical      thin (NO BRAND SPECIFIED) lancets Use with lanceting device. To accompany: Blood Glucose Monitor Brands: per insurance., Disp-100 each, R-6, E-Prescribe           STOP taking these medications       insulin NPH (HUMULIN N KWIKPEN) 100 UNIT/ML injection Comments:   Reason for Stopping:                     Consultations:   No consultations were requested during this admission          Brief History of Labor or Admission:   4yo  @ 39 3/7wks. GBS neg. Admitted  for IOL. PNC with [facility], Was on insulin for GDM, but was able to discontinue after only a few weeks with diet/exercise.  Was started with cervical ripening .  Received eipdural and was on pitocin when had deep deceleration. Was taken to or for urgent csection due to  fetal intolerance to labor.  There were no operative complications.            Hospital Course:   The patient's hospital course was unremarkable.  She recovered as anticipated and experienced no post-operative complications.  On discharge, her pain was well controlled. Vaginal bleeding is similar to peak menstrual  flow.  Voiding without difficulty.  Ambulating well and tolerating a normal diet.  No fever or significant wound drainage.  Breastfeeding well.  Infant is stable.  No bowel movement yet.  She was discharged on post-partum day #4.    Post-partum hemoglobin:   Hemoglobin   Date Value Ref Range Status   01/23/2024 9.8 (L) 11.7 - 15.7 g/dL Final   10/14/2021 13.1 g/dL Final     Comment:     lot [id number]  exp 4/20/22             Discharge Instructions and Follow-Up:     Discharge diet: Regular   Discharge activity: No lifting, driving, or strenuous exercise for 6 week(s)  No driving or operating machinery while on narcotic analgesics   Discharge follow-up: Follow up with [facility]  in 2 weeks   Wound care: Ice to area for comfort  Keep wound clean and dry           Discharge Disposition:     Discharged to home      Attestation:  I have reviewed today's vital signs, notes, medications, labs and imaging.    Rissa Sandoval MD

## 2024-03-06 ENCOUNTER — MEDICAL CORRESPONDENCE (OUTPATIENT)
Dept: HEALTH INFORMATION MANAGEMENT | Facility: CLINIC | Age: 44
End: 2024-03-06
Payer: COMMERCIAL

## 2024-03-14 ENCOUNTER — HOSPITAL ENCOUNTER (OUTPATIENT)
Dept: CT IMAGING | Facility: CLINIC | Age: 44
Discharge: HOME | End: 2024-03-14
Attending: OBSTETRICS & GYNECOLOGY | Admitting: OBSTETRICS & GYNECOLOGY
Payer: COMMERCIAL

## 2024-03-14 DIAGNOSIS — S30.1XXA RECTUS SHEATH HEMATOMA: ICD-10-CM

## 2024-03-14 PROCEDURE — 74176 CT ABD & PELVIS W/O CONTRAST: CPT

## 2024-03-20 ENCOUNTER — LAB REQUISITION (OUTPATIENT)
Dept: LAB | Facility: CLINIC | Age: 44
End: 2024-03-20

## 2024-03-20 DIAGNOSIS — E03.9 HYPOTHYROIDISM, UNSPECIFIED: ICD-10-CM

## 2024-03-20 LAB — TSH SERPL DL<=0.005 MIU/L-ACNC: 1.51 UIU/ML (ref 0.3–4.2)

## 2024-03-20 PROCEDURE — 84443 ASSAY THYROID STIM HORMONE: CPT | Performed by: OBSTETRICS & GYNECOLOGY

## 2024-05-11 ENCOUNTER — HEALTH MAINTENANCE LETTER (OUTPATIENT)
Age: 44
End: 2024-05-11

## 2025-05-17 ENCOUNTER — HEALTH MAINTENANCE LETTER (OUTPATIENT)
Age: 45
End: 2025-05-17

## (undated) DEVICE — CUSTOM PACK C-SECTION LHE

## (undated) DEVICE — DRSG PRIMAPORE 04X11 3/4"

## (undated) DEVICE — PREP CHLORAPREP 26ML TINTED HI-LITE ORANGE 930815

## (undated) DEVICE — GLOVE BIOGEL PI ULTRATOUCH G SZ 6.5 42165

## (undated) DEVICE — DECANTER VIAL 2006S

## (undated) DEVICE — GLOVE UNDER INDICATOR PI SZ 6.5 LF 41665

## (undated) DEVICE — SUTURE VICRYL+ 0 36IN CT-1 UND VCP946H

## (undated) DEVICE — SUTURE MONOCRYL+ 4-0 PS-2 27IN MCP426H

## (undated) DEVICE — GOWN IMPERVIOUS BREATHABLE SMART LG 89015

## (undated) DEVICE — DRSG STERI STRIP 1/2X4" R1547

## (undated) DEVICE — PACK MINOR SINGLE BASIN SSK3001

## (undated) DEVICE — SOL NACL 0.9% IRRIG 1000ML BOTTLE 2F7124

## (undated) DEVICE — SUCTION TIP YANKAUER W/O VENT K86

## (undated) DEVICE — ADH LIQUID MASTISOL TOPICAL VIAL 2-3ML 0523-48

## (undated) DEVICE — SURGICEL POWDER ABSORBABLE HEMOSTAT 3GM 3013SP

## (undated) RX ORDER — MORPHINE SULFATE 1 MG/ML
INJECTION, SOLUTION EPIDURAL; INTRATHECAL; INTRAVENOUS
Status: DISPENSED
Start: 2024-01-22

## (undated) RX ORDER — FENTANYL CITRATE-0.9 % NACL/PF 10 MCG/ML
PLASTIC BAG, INJECTION (ML) INTRAVENOUS
Status: DISPENSED
Start: 2024-01-22

## (undated) RX ORDER — FENTANYL CITRATE 50 UG/ML
INJECTION, SOLUTION INTRAMUSCULAR; INTRAVENOUS
Status: DISPENSED
Start: 2024-01-22

## (undated) RX ORDER — KETOROLAC TROMETHAMINE 30 MG/ML
INJECTION, SOLUTION INTRAMUSCULAR; INTRAVENOUS
Status: DISPENSED
Start: 2024-01-22

## (undated) RX ORDER — ONDANSETRON 2 MG/ML
INJECTION INTRAMUSCULAR; INTRAVENOUS
Status: DISPENSED
Start: 2024-01-22